# Patient Record
Sex: MALE | Race: WHITE | NOT HISPANIC OR LATINO | Employment: STUDENT | URBAN - METROPOLITAN AREA
[De-identification: names, ages, dates, MRNs, and addresses within clinical notes are randomized per-mention and may not be internally consistent; named-entity substitution may affect disease eponyms.]

---
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
## 2020-01-07 ENCOUNTER — OFFICE VISIT (OUTPATIENT)
Dept: FAMILY MEDICINE | Facility: CLINIC | Age: 20
End: 2020-01-07
Payer: COMMERCIAL

## 2020-01-07 VITALS
OXYGEN SATURATION: 97 % | BODY MASS INDEX: 24.48 KG/M2 | HEIGHT: 64 IN | RESPIRATION RATE: 18 BRPM | TEMPERATURE: 98.1 F | DIASTOLIC BLOOD PRESSURE: 80 MMHG | WEIGHT: 143.4 LBS | HEART RATE: 93 BPM | SYSTOLIC BLOOD PRESSURE: 133 MMHG

## 2020-01-07 DIAGNOSIS — Z01.818 PREOP GENERAL PHYSICAL EXAM: Primary | ICD-10-CM

## 2020-01-07 LAB — HGB BLD-MCNC: 17.2 G/DL (ref 13.3–17.7)

## 2020-01-07 PROCEDURE — 99214 OFFICE O/P EST MOD 30 MIN: CPT | Performed by: PHYSICIAN ASSISTANT

## 2020-01-07 PROCEDURE — 85018 HEMOGLOBIN: CPT | Performed by: PHYSICIAN ASSISTANT

## 2020-01-07 PROCEDURE — 36415 COLL VENOUS BLD VENIPUNCTURE: CPT | Performed by: PHYSICIAN ASSISTANT

## 2020-01-07 ASSESSMENT — MIFFLIN-ST. JEOR: SCORE: 1576.46

## 2020-01-07 NOTE — LETTER
My Asthma Action Plan    Name: Estuardo Trevino   YOB: 2000  Date: 1/7/2020   My doctor: Ananth Millard PA-C   My clinic: [facility]        My Rescue Medicine:   Albuterol inhaler (Proair/Ventolin/Proventil HFA)  2-4 puffs EVERY 4 HOURS as needed. Use a spacer if recommended by your provider.   My Asthma Severity:   Intermittent / Exercise Induced  Know your asthma triggers: upper respiratory infections, dust mites, pollens and animal dander             GREEN ZONE   Good Control    I feel good    No cough or wheeze    Can work, sleep and play without asthma symptoms       Take your asthma control medicine every day.     1. If exercise triggers your asthma, take your rescue medication    15 minutes before exercise or sports, and    During exercise if you have asthma symptoms  2. Spacer to use with inhaler: If you have a spacer, make sure to use it with your inhaler             YELLOW ZONE Getting Worse  I have ANY of these:    I do not feel good    Cough or wheeze    Chest feels tight    Wake up at night   1. Keep taking your Green Zone medications  2. Start taking your rescue medicine:    every 20 minutes for up to 1 hour. Then every 4 hours for 24-48 hours.  3. If you stay in the Yellow Zone for more than 12-24 hours, contact your doctor.  4. If you do not return to the Green Zone in 12-24 hours or you get worse, start taking your oral steroid medicine if prescribed by your provider.           RED ZONE Medical Alert - Get Help  I have ANY of these:    I feel awful    Medicine is not helping    Breathing getting harder    Trouble walking or talking    Nose opens wide to breathe       1. Take your rescue medicine NOW  2. If your provider has prescribed an oral steroid medicine, start taking it NOW  3. Call your doctor NOW  4. If you are still in the Red Zone after 20 minutes and you have not reached your doctor:    Take your rescue medicine again and    Call 911 or go to the  emergency room right away    See your regular doctor within 2 weeks of an Emergency Room or Urgent Care visit for follow-up treatment.          Annual Reminders:  Meet with Asthma Educator,  Flu Shot in the Fall, consider Pneumonia Vaccination for patients with asthma (aged 19 and older).    Pharmacy:    [facility] [place], [place] - [address] [id number] - [place], [place] - [address] AT [id number] - [place], [place] - [address] AT [facility] [address]    Electronically signed by Ananth Millard PA-C   Date: 01/07/20                    Asthma Triggers  How To Control Things That Make Your Asthma Worse    Triggers are things that make your asthma worse.  Look at the list below to help you find your triggers and   what you can do about them. You can help prevent asthma flare-ups by staying away from your triggers.      Trigger                                                          What you can do   Cigarette Smoke  Tobacco smoke can make asthma worse. Do not allow smoking in your home, car or around you.  Be sure no one smokes at a child s day care or school.  If you smoke, ask your health care provider for ways to help you quit.  Ask family members to quit too.  Ask your health care provider for a referral to Quit Plan to help you quit smoking, or call 2-842-485-PLAN.     Colds, Flu, Bronchitis  These are common triggers of asthma. Wash your hands often.  Don t touch your eyes, nose or mouth.  Get a flu shot every year.     Dust Mites  These are tiny bugs that live in cloth or carpet. They are too small to see. Wash sheets and blankets in hot water every week.   Encase pillows and mattress in dust mite proof covers.  Avoid having carpet if you can. If you have carpet, vacuum weekly.   Use a dust mask and HEPA vacuum.   Pollen and Outdoor Mold  Some people are allergic to trees, grass, or weed pollen,  or molds. Try to keep your windows closed.  Limit time out doors when pollen count is high.   Ask you health care provider about taking medicine during allergy season.     Animal Dander  Some people are allergic to skin flakes, urine or saliva from pets with fur or feathers. Keep pets with fur or feathers out of your home.    If you can t keep the pet outdoors, then keep the pet out of your bedroom.  Keep the bedroom door closed.  Keep pets off cloth furniture and away from stuffed toys.     Mice, Rats, and Cockroaches  Some people are allergic to the waste from these pests.   Cover food and garbage.  Clean up spills and food crumbs.  Store grease in the refrigerator.   Keep food out of the bedroom.   Indoor Mold  This can be a trigger if your home has high moisture. Fix leaking faucets, pipes, or other sources of water.   Clean moldy surfaces.  Dehumidify basement if it is damp and smelly.   Smoke, Strong Odors, and Sprays  These can reduce air quality. Stay away from strong odors and sprays, such as perfume, powder, hair spray, paints, smoke incense, paint, cleaning products, candles and new carpet.   Exercise or Sports  Some people with asthma have this trigger. Be active!  Ask your doctor about taking medicine before sports or exercise to prevent symptoms.    Warm up for 5-10 minutes before and after sports or exercise.     Other Triggers of Asthma  Cold air:  Cover your nose and mouth with a scarf.  Sometimes laughing or crying can be a trigger.  Some medicines and food can trigger asthma.

## 2020-01-07 NOTE — PROGRESS NOTES
[facility]  [address] [facility] [id number] [phone]  Dept: [phone]    PRE-OP EVALUATION:  Today's date: 2020    Estuardo Trevino (: 2000) presents for pre-operative evaluation assessment as requested by Dr. Ramirez.  He requires evaluation and anesthesia risk assessment prior to undergoing surgery/procedure for treatment of right knee pain .    Fax number for surgical facility:   Primary Physician: Bonny Aranda  Type of Anesthesia Anticipated: General    Patient has a Health Care Directive or Living Will:  NO    Preop Questions 2020   Who is doing your surgery? Dr. Ramirez   What are you having done? Scar tissue removal and bone adjustment   Date of Surgery/Procedure: 2020   Facility or Hospital where procedure/surgery will be performed: [facility] in [place]   1.  Do you have a history of Heart attack, stroke, stent, coronary bypass surgery, or other heart surgery? No   2.  Do you ever have any pain or discomfort in your chest? No   3.  Do you have a history of  Heart Failure? No   4.   Are you troubled by shortness of breath when:  walking on a level surface, or up a slight hill, or at night? No   5.  Do you currently have a cold, bronchitis or other respiratory infection? No   6.  Do you have a cough, shortness of breath, or wheezing? No   7.  Do you sometimes get pains in the calves of your legs when you walk? No   8. Do you or anyone in your family have previous history of blood clots? No   9.  Do you or does anyone in your family have a serious bleeding problem such as prolonged bleeding following surgeries or cuts? No   10. Have you ever had problems with anemia or been told to take iron pills? No   11. Have you had any abnormal blood loss such as black, tarry or bloody stools? No   12. Have you ever had a blood transfusion? No   13. Have you or any of your relatives ever had problems with anesthesia? No   14. Do you have sleep  apnea, excessive snoring or daytime drowsiness? No   15. Do you have any prosthetic heart valves? No   16. Do you have prosthetic joints? No         HPI:     HPI related to upcoming procedure:  History of ANTERIOR CRUCIATE LIGAMENT reconstruction 2018 with post operative development of Arthrofibrosis.     See problem list for active medical problems.  Problems all longstanding and stable, except as noted/documented.  See ROS for pertinent symptoms related to these conditions.      MEDICAL HISTORY:     Patient Active Problem List    Diagnosis Date Noted     Mild intermittent asthma without complication 11/23/2018     Priority: Medium     Right knee pain, unspecified chronicity 11/23/2018     Priority: Medium     Seasonal allergies 09/13/2013     Priority: Medium     Epistaxis 10/21/2011     Priority: Medium      Past Medical History:   Diagnosis Date     Asthma      Past Surgical History:   Procedure Laterality Date     ARTHROSCOPIC RECONSTRUCTION ANTERIOR CRUCIATE LIGAMENT BONE TENDON BONE AUTOGRAFT Right 12/21/2018    Procedure: Examination Under Anesthesia Right Knee, Right Anterior Cruciate Ligament Reconstruction, Bone Tendon Bone Autograft;  Surgeon: Ady Espinoza MD;  Location: [facility] OR     EXAM UNDER ANESTHESIA, MANIPULATE JOINT (LOCATION) Right 3/22/2019    Procedure: Examination Under Anesthesia Right Knee, Right Knee Manipulation Under Anesthesia;  Surgeon: Ady Espinoza MD;  Location: [facility] OR     LYSIS OF ADHESIONS KNEE Right 3/22/2019    Procedure: Right Knee Arthroscopic Lysis of Adhesions;  Surgeon: Ady Espinoza MD;  Location: [facility] OR     wisdom teeth       Current Outpatient Medications   Medication Sig Dispense Refill     acetaminophen (TYLENOL) 325 MG tablet Take 2 tablets (650 mg) by mouth every 4 hours 80 tablet 0     albuterol (PROAIR HFA/PROVENTIL HFA/VENTOLIN HFA) 108 (90 Base) MCG/ACT inhaler INHALE 2 PUFFS INTO THE LUNGS EVERY 4 HOURS AS NEEDED FOR  SHORTNESS OF BREATH OR DIFFICULT BREATHING 8.5 g 3     albuterol (PROAIR HFA/PROVENTIL HFA/VENTOLIN HFA) 108 (90 Base) MCG/ACT inhaler Inhale 2 puffs into the lungs every 4 hours as needed for shortness of breath / dyspnea 1 Inhaler 0     budesonide (PULMICORT FLEXHALER) 180 MCG/ACT inhaler Inhale 1 puff into the lungs 2 times daily 1 Inhaler 0     diclofenac (VOLTAREN) 75 MG EC tablet Take 1 tablet (75 mg) by mouth 2 times daily 60 tablet 0     methylPREDNISolone (MEDROL DOSEPAK) 4 MG tablet therapy pack Take 2 tablets (8 mg) by mouth See Admin Instructions follow package directions 21 tablet 0     PULMICORT FLEXHALER 180 MCG/ACT inhaler INHALE 1 PUFF INTO THE LUNGS TWICE DAILY 3 Inhaler 1     ondansetron (ZOFRAN-ODT) 4 MG ODT tab Take 1-2 tablets (4-8 mg) by mouth every 8 hours as needed for nausea (Patient not taking: Reported on 4/15/2019) 4 tablet 0     oxyCODONE (ROXICODONE) 5 MG tablet Take 1-2 tablets (5-10 mg) by mouth every 4 hours as needed for moderate to severe pain (Patient not taking: Reported on 4/15/2019) 26 tablet 0     senna-docusate (SENOKOT-S/PERICOLACE) 8.6-50 MG tablet Take 1-2 tablets by mouth 2 times daily (Patient not taking: Reported on 4/15/2019) 16 tablet 0     tiZANidine (ZANAFLEX) 4 MG tablet Take 1-2 tablets (4-8 mg) by mouth every 8 hours as needed for muscle spasms 60 tablet 0     OTC products: None, except as noted above    Allergies   Allergen Reactions     Nkda [No Known Drug Allergies]       Latex Allergy: NO    Social History     Tobacco Use     Smoking status: Never Smoker     Smokeless tobacco: Never Used   Substance Use Topics     Alcohol use: No     History   Drug Use No       REVIEW OF SYSTEMS:   CONSTITUTIONAL: NEGATIVE for fever, chills, change in weight  INTEGUMENTARY/SKIN: NEGATIVE for worrisome rashes, moles or lesions  EYES: NEGATIVE for vision changes or irritation  ENT/MOUTH: NEGATIVE for ear, mouth and throat problems  RESP: NEGATIVE for significant cough or  "SOB  CV: NEGATIVE for chest pain, palpitations or peripheral edema  GI: NEGATIVE for nausea, abdominal pain, heartburn, or change in bowel habits  : NEGATIVE for frequency, dysuria, or hematuria  MUSCULOSKELETAL: NEGATIVE for significant arthralgias or myalgia  NEURO: NEGATIVE for weakness, dizziness or paresthesias  ENDOCRINE: NEGATIVE for temperature intolerance, skin/hair changes  HEME: NEGATIVE for bleeding problems  PSYCHIATRIC: NEGATIVE for changes in mood or affect    EXAM:   /80   Pulse 93   Temp 98.1  F (36.7  C) (Oral)   Resp 18   Ht 1.626 m (5' 4\")   Wt 65 kg (143 lb 6.4 oz)   SpO2 97%   BMI 24.61 kg/m      GENERAL APPEARANCE: healthy, alert and no distress     EYES: EOMI,  PERRL     HENT: ear canals and TM's normal and nose and mouth without ulcers or lesions     NECK: no adenopathy, no asymmetry, masses, or scars and thyroid normal to palpation     RESP: lungs clear to auscultation - no rales, rhonchi or wheezes     CV: regular rates and rhythm, normal S1 S2, no S3 or S4 and no murmur, click or rub     ABDOMEN:  soft, nontender, no HSM or masses and bowel sounds normal     MS: extremities normal- no gross deformities noted, no evidence of inflammation in joints, FROM in all extremities.     SKIN: no suspicious lesions or rashes     NEURO: Normal strength and tone, sensory exam grossly normal, mentation intact and speech normal     PSYCH: mentation appears normal. and affect normal/bright     LYMPHATICS: No cervical adenopathy    DIAGNOSTICS:     Labs Drawn and in Process:     Results for orders placed or performed in visit on 01/07/20   Hemoglobin     Status: None   Result Value Ref Range    Hemoglobin 17.2 13.3 - 17.7 g/dL        Recent Labs   Lab Test 11/23/18  1046 10/23/17  1359   HGB 16.6 17.6*   PLT  --  269        IMPRESSION:   Reason for surgery/procedure: right knee pain    The proposed surgical procedure is considered LOW risk.    REVISED CARDIAC RISK INDEX  The patient has the " following serious cardiovascular risks for perioperative complications such as (MI, PE, VFib and 3  AV Block):  No serious cardiac risks  INTERPRETATION: 0 risks: Class I (very low risk - 0.4% complication rate)    The patient has the following additional risks for perioperative complications:  No identified additional risks      ICD-10-CM    1. Preop general physical exam Z01.818 Hemoglobin       RECOMMENDATIONS:         --Patient is on no chronic medications    APPROVAL GIVEN to proceed with proposed procedure, without further diagnostic evaluation       Signed Electronically by: Ananth Millard PA-C  Discussed this patient with Ananth Millard and agree with above assessment and plan. The patient is an acceptable candidate for the proposed anasthesia.  Jim Escalante MD      Copy of this evaluation report is provided to requesting physician.    [facility] Preop Guidelines    Revised Cardiac Risk Index

## 2020-01-08 ASSESSMENT — ASTHMA QUESTIONNAIRES: ACT_TOTALSCORE: 25

## 2020-01-08 NOTE — PROGRESS NOTES
No fax number given on pre-op. Called [facility] in [place] at # [phone] and spoke to a representative. She gave me the fax number of # [phone] for Dr. Ramirez. Pre-op and lab result have been faxed to  # [phone]. DOS is 02/04/2020.  Danette Frausto TC

## 2020-01-09 ENCOUNTER — TELEPHONE (OUTPATIENT)
Dept: FAMILY MEDICINE | Facility: CLINIC | Age: 20
End: 2020-01-09

## 2020-01-09 NOTE — TELEPHONE ENCOUNTER
Reason for Call:  Form, our goal is to have forms completed with 72 hours, however, some forms may require a visit or additional information.    Type of letter, form or note:  medical    Who is the form from?: Patient    Where did the form come from: Patient or family brought in       What clinic location was the form placed at?: [place]    Where the form was placed: oppel's t[facility] Box/Folder    What number is listed as a contact on the form?: [phone]       Additional comments: pt forgot to bring in forms at his appointment    Call taken on 1/9/2020 at 11:28 AM by Keiry Hopkins

## 2020-01-31 ENCOUNTER — TELEPHONE (OUTPATIENT)
Dept: FAMILY MEDICINE | Facility: CLINIC | Age: 20
End: 2020-01-31

## 2020-01-31 NOTE — TELEPHONE ENCOUNTER
Pre-op was initially faxed to [facility] [place] Att.Dr. Hernandez @ [phone] on 01/08/2020, I have re-faxed it to the same number as well as the other generic [facility] fax # [phone] as patient didn't leave any fax info in message. DOS 02/04/2020.   I called the patient @ [phone]. No answer. I left a detailed voicemail that I have refaxed the pre-op and if he has any other questions or concerns to call my direct line # [phone].  Danette Frausto TC

## 2020-01-31 NOTE — TELEPHONE ENCOUNTER
Patient calling had a pre op on 1/7 and is scheduled for surgery on 2/4. Surgery center called him and they have not received the paper work. States brought in packet with name of surgery center and fax number. Does not have info at this time.

## 2020-02-04 ENCOUNTER — TRANSFERRED RECORDS (OUTPATIENT)
Dept: HEALTH INFORMATION MANAGEMENT | Facility: CLINIC | Age: 20
End: 2020-02-04

## 2020-02-07 ENCOUNTER — TRANSFERRED RECORDS (OUTPATIENT)
Dept: HEALTH INFORMATION MANAGEMENT | Facility: CLINIC | Age: 20
End: 2020-02-07

## 2020-02-17 ENCOUNTER — TRANSFERRED RECORDS (OUTPATIENT)
Dept: HEALTH INFORMATION MANAGEMENT | Facility: CLINIC | Age: 20
End: 2020-02-17

## 2020-02-24 ENCOUNTER — HEALTH MAINTENANCE LETTER (OUTPATIENT)
Age: 20
End: 2020-02-24

## 2020-03-04 ENCOUNTER — MYC MEDICAL ADVICE (OUTPATIENT)
Dept: FAMILY MEDICINE | Facility: CLINIC | Age: 20
End: 2020-03-04

## 2020-03-04 ENCOUNTER — TRANSFERRED RECORDS (OUTPATIENT)
Dept: HEALTH INFORMATION MANAGEMENT | Facility: CLINIC | Age: 20
End: 2020-03-04

## 2020-03-04 ENCOUNTER — ANCILLARY PROCEDURE (OUTPATIENT)
Dept: GENERAL RADIOLOGY | Facility: CLINIC | Age: 20
End: 2020-03-04
Attending: INTERNAL MEDICINE
Payer: COMMERCIAL

## 2020-03-04 ENCOUNTER — OFFICE VISIT (OUTPATIENT)
Dept: FAMILY MEDICINE | Facility: CLINIC | Age: 20
End: 2020-03-04
Payer: COMMERCIAL

## 2020-03-04 VITALS
BODY MASS INDEX: 23.34 KG/M2 | TEMPERATURE: 98.9 F | HEART RATE: 103 BPM | RESPIRATION RATE: 16 BRPM | SYSTOLIC BLOOD PRESSURE: 148 MMHG | WEIGHT: 136 LBS | DIASTOLIC BLOOD PRESSURE: 81 MMHG | OXYGEN SATURATION: 99 %

## 2020-03-04 DIAGNOSIS — R19.7 DIARRHEA, UNSPECIFIED TYPE: ICD-10-CM

## 2020-03-04 DIAGNOSIS — K59.00 CONSTIPATION, UNSPECIFIED CONSTIPATION TYPE: Primary | ICD-10-CM

## 2020-03-04 PROCEDURE — 74019 RADEX ABDOMEN 2 VIEWS: CPT

## 2020-03-04 PROCEDURE — 99214 OFFICE O/P EST MOD 30 MIN: CPT | Performed by: INTERNAL MEDICINE

## 2020-03-04 RX ORDER — POLYETHYLENE GLYCOL 3350 17 G/17G
1 POWDER, FOR SOLUTION ORAL DAILY
Qty: 850 G | Refills: 0 | Status: SHIPPED | OUTPATIENT
Start: 2020-03-04 | End: 2023-03-11

## 2020-03-04 NOTE — PROGRESS NOTES
SUBJECTIVE:  Estuardo Trevino is an 19 year old male who presents for diarrhea.  started about 9 days ago and lasted 2-3 days; then had constipation a few days, then diarrhea again over past couple days.   Diarrhea is liquid but not water.   No abdominal pain or cramping. Has been gassy, glen after eating.  Has had issues over past few months.  A little blood on toilet paper a couple times after having a BM.  No blood mixed into stool and no black stool.  No fevers, chills, sweats currently.  No recent travel.  No n/v.  Eating normally but sometimes feels like it goes right through him.  No known exposures.  Took an antidiarrhea med yesterday and today which helped a little yesterday.  No FH of bowel issues or problems. Is on methylprednisolone for scar reduction after knee surgery on 2/4/2020.  Also was on pain medication after surgery also.      PMH:   has a past medical history of Asthma.  Patient Active Problem List   Diagnosis     Epistaxis     Seasonal allergies     Mild intermittent asthma without complication     Right knee pain, unspecified chronicity     Social History     Socioeconomic History     Marital status: Single     Spouse name: Not on file     Number of children: Not on file     Years of education: Not on file     Highest education level: Not on file   Occupational History     Not on file   Social Needs     Financial resource strain: Not on file     Food insecurity:     Worry: Not on file     Inability: Not on file     Transportation needs:     Medical: Not on file     Non-medical: Not on file   Tobacco Use     Smoking status: Never Smoker     Smokeless tobacco: Never Used   Substance and Sexual Activity     Alcohol use: No     Drug use: No     Sexual activity: Never   Lifestyle     Physical activity:     Days per week: Not on file     Minutes per session: Not on file     Stress: Not on file   Relationships     Social connections:     Talks on phone: Not on file     Gets together: Not on file      Attends Sabianism service: Not on file     Active member of club or organization: Not on file     Attends meetings of clubs or organizations: Not on file     Relationship status: Not on file     Intimate partner violence:     Fear of current or ex partner: Not on file     Emotionally abused: Not on file     Physically abused: Not on file     Forced sexual activity: Not on file   Other Topics Concern     Not on file   Social History Narrative     Not on file     Family History   Problem Relation Age of Onset     Eye Disorder Mother      Allergies Father      Diabetes Maternal Grandmother         type 2     Depression Maternal Grandmother      Hypertension Maternal Grandfather      Lipids Maternal Grandfather      C.A.D. Maternal Grandfather      Asthma Brother        ALLERGIES:  Nkda [no known drug allergies]    Current Outpatient Medications   Medication     methylPREDNISolone (MEDROL DOSEPAK) 4 MG tablet therapy pack     polyethylene glycol (MIRALAX) powder     acetaminophen (TYLENOL) 325 MG tablet     albuterol (PROAIR HFA/PROVENTIL HFA/VENTOLIN HFA) 108 (90 Base) MCG/ACT inhaler     albuterol (PROAIR HFA/PROVENTIL HFA/VENTOLIN HFA) 108 (90 Base) MCG/ACT inhaler     budesonide (PULMICORT FLEXHALER) 180 MCG/ACT inhaler     diclofenac (VOLTAREN) 75 MG EC tablet     ondansetron (ZOFRAN-ODT) 4 MG ODT tab     oxyCODONE (ROXICODONE) 5 MG tablet     PULMICORT FLEXHALER 180 MCG/ACT inhaler     senna-docusate (SENOKOT-S/PERICOLACE) 8.6-50 MG tablet     tiZANidine (ZANAFLEX) 4 MG tablet     Current Facility-Administered Medications   Medication     lidocaine (PF) 0.5 % injection SOLN 8 mL     triamcinolone (KENALOG-40) injection 40 mg         ROS:  ROS is done and is negative for general/constitutional, eye, ENT, Respiratory, cardiovascular, GI, , Skin, musculoskeletal except as noted elsewhere.  All other review of systems negative except as noted elsewhere.      OBJECTIVE:  BP (!) 148/81   Pulse 103   Temp 98.9  F  (37.2  C) (Oral)   Resp 16   Wt 61.7 kg (136 lb)   SpO2 99%   BMI 23.34 kg/m    GENERAL APPEARANCE: Alert, in no acute distress  EYES: normal  NOSE:normal  OROPHARYNX:normal  NECK:No adenopathy,masses or thyromegaly  RESP: normal and clear to auscultation  CV:regular rate and rhythm and no murmurs, clicks, or gallops  ABDOMEN: Abdomen soft, non-tender. BS normal. No masses, organomegaly  SKIN: no ulcers, lesions or rash  MUSCULOSKELETAL:Musculoskeletal normal      RESULTS  Xrays of abdomen-  Moderate stool present, no free air, on my reading.      .  Recent Results (from the past 48 hour(s))   XR Abdomen 2 Views    Narrative    ABDOMEN TWO-THREE VIEW  3/4/2020 4:18 PM     HISTORY: Diarrhea, unspecified type.    COMPARISON: None.    FINDINGS: Small to moderate amount of stool. No free air. There are no  air filled distended loops of small bowel. The colon is not distended.  The lung bases are unremarkable.      Impression    IMPRESSION: Nonobstructed bowel gas pattern.       ASSESSMENT/PLAN:    ASSESSMENT / PLAN:  (K59.00) Constipation, unspecified constipation type  (primary encounter diagnosis)  Comment: pt's sxs are c/w constipation and moderate stool seen on xray.  His recent surgery and post-op pain meds likely contributed to onset.  He has had a little blood on toilet paper a couple times which is likely related to passing of stool irritating rectum, but if not resolve, he is to see GI.  Plan: polyethylene glycol (MIRALAX) powder        Reviewed medication instructions and side effects. Follow up if experiences side effects..  See instructions for tapering med as needed.  I reviewed supportive care, otc meds to use if needed, expected course, and signs of concern.  Follow up as needed if he does not improve within 2 weeks.  If he does not improve or if his sxs recur or he has ongoing blood from rectum, he is to see GI, so referral placed in case is needed.  Reviewed red flag symptoms and is to go to the ER  if experiences any of these.    (R19.7) Diarrhea, unspecified type  Comment: currently c/w underlying issue of constipation.   Plan: XR Abdomen 2 Views, polyethylene glycol         (MIRALAX) powder, GASTROENTEROLOGY ADULT REF         CONSULT ONLY        As above.  If sxs persist he is to see GI so referral placed in case needed.      See [facility] for orders, medications, letters, patient instructions    Nydia Taylor M.D.

## 2020-03-05 ENCOUNTER — MYC MEDICAL ADVICE (OUTPATIENT)
Dept: FAMILY MEDICINE | Facility: CLINIC | Age: 20
End: 2020-03-05

## 2020-03-09 ENCOUNTER — MYC MEDICAL ADVICE (OUTPATIENT)
Dept: FAMILY MEDICINE | Facility: CLINIC | Age: 20
End: 2020-03-09

## 2020-03-12 ENCOUNTER — E-VISIT (OUTPATIENT)
Dept: FAMILY MEDICINE | Facility: CLINIC | Age: 20
End: 2020-03-12
Payer: COMMERCIAL

## 2020-03-12 DIAGNOSIS — R19.7 BLOODY DIARRHEA: Primary | ICD-10-CM

## 2020-03-12 PROCEDURE — 99421 OL DIG E/M SVC 5-10 MIN: CPT | Performed by: FAMILY MEDICINE

## 2020-03-13 DIAGNOSIS — R19.7 BLOODY DIARRHEA: ICD-10-CM

## 2020-03-13 LAB
ALBUMIN SERPL-MCNC: 3.9 G/DL (ref 3.4–5)
ALP SERPL-CCNC: 77 U/L (ref 65–260)
ALT SERPL W P-5'-P-CCNC: 28 U/L (ref 0–50)
ANION GAP SERPL CALCULATED.3IONS-SCNC: 5 MMOL/L (ref 3–14)
AST SERPL W P-5'-P-CCNC: 14 U/L (ref 0–35)
BASOPHILS # BLD AUTO: 0 10E9/L (ref 0–0.2)
BASOPHILS NFR BLD AUTO: 0.3 %
BILIRUB SERPL-MCNC: 0.4 MG/DL (ref 0.2–1.3)
BUN SERPL-MCNC: 14 MG/DL (ref 7–30)
CALCIUM SERPL-MCNC: 9.5 MG/DL (ref 8.5–10.1)
CHLORIDE SERPL-SCNC: 107 MMOL/L (ref 98–110)
CO2 SERPL-SCNC: 28 MMOL/L (ref 20–32)
CREAT SERPL-MCNC: 0.84 MG/DL (ref 0.5–1)
CRP SERPL-MCNC: <2.9 MG/L (ref 0–8)
DIFFERENTIAL METHOD BLD: NORMAL
EOSINOPHIL # BLD AUTO: 0.1 10E9/L (ref 0–0.7)
EOSINOPHIL NFR BLD AUTO: 2.1 %
ERYTHROCYTE [DISTWIDTH] IN BLOOD BY AUTOMATED COUNT: 12.8 % (ref 10–15)
ERYTHROCYTE [SEDIMENTATION RATE] IN BLOOD BY WESTERGREN METHOD: 4 MM/H (ref 0–15)
GFR SERPL CREATININE-BSD FRML MDRD: >90 ML/MIN/{1.73_M2}
GLUCOSE SERPL-MCNC: 94 MG/DL (ref 70–99)
HCT VFR BLD AUTO: 50.6 % (ref 40–53)
HGB BLD-MCNC: 17.7 G/DL (ref 13.3–17.7)
LYMPHOCYTES # BLD AUTO: 2.4 10E9/L (ref 0.8–5.3)
LYMPHOCYTES NFR BLD AUTO: 39 %
MCH RBC QN AUTO: 30.8 PG (ref 26.5–33)
MCHC RBC AUTO-ENTMCNC: 35 G/DL (ref 31.5–36.5)
MCV RBC AUTO: 88 FL (ref 78–100)
MONOCYTES # BLD AUTO: 0.5 10E9/L (ref 0–1.3)
MONOCYTES NFR BLD AUTO: 7.5 %
NEUTROPHILS # BLD AUTO: 3.2 10E9/L (ref 1.6–8.3)
NEUTROPHILS NFR BLD AUTO: 51.1 %
PLATELET # BLD AUTO: 302 10E9/L (ref 150–450)
POTASSIUM SERPL-SCNC: 4.1 MMOL/L (ref 3.4–5.3)
PROT SERPL-MCNC: 7.5 G/DL (ref 6.8–8.8)
RBC # BLD AUTO: 5.75 10E12/L (ref 4.4–5.9)
SODIUM SERPL-SCNC: 140 MMOL/L (ref 133–144)
WBC # BLD AUTO: 6.3 10E9/L (ref 4–11)

## 2020-03-13 PROCEDURE — 36415 COLL VENOUS BLD VENIPUNCTURE: CPT | Performed by: FAMILY MEDICINE

## 2020-03-13 PROCEDURE — 80053 COMPREHEN METABOLIC PANEL: CPT | Performed by: FAMILY MEDICINE

## 2020-03-13 PROCEDURE — 85025 COMPLETE CBC W/AUTO DIFF WBC: CPT | Performed by: FAMILY MEDICINE

## 2020-03-13 PROCEDURE — 85652 RBC SED RATE AUTOMATED: CPT | Performed by: FAMILY MEDICINE

## 2020-03-13 PROCEDURE — 86140 C-REACTIVE PROTEIN: CPT | Performed by: FAMILY MEDICINE

## 2020-03-14 DIAGNOSIS — R19.7 BLOODY DIARRHEA: ICD-10-CM

## 2020-03-14 PROCEDURE — 87328 CRYPTOSPORIDIUM AG IA: CPT | Performed by: FAMILY MEDICINE

## 2020-03-14 PROCEDURE — 87506 IADNA-DNA/RNA PROBE TQ 6-11: CPT | Performed by: FAMILY MEDICINE

## 2020-03-14 PROCEDURE — 36415 COLL VENOUS BLD VENIPUNCTURE: CPT | Performed by: FAMILY MEDICINE

## 2020-03-14 PROCEDURE — 87329 GIARDIA AG IA: CPT | Mod: XU | Performed by: FAMILY MEDICINE

## 2020-03-16 ENCOUNTER — TRANSFERRED RECORDS (OUTPATIENT)
Dept: HEALTH INFORMATION MANAGEMENT | Facility: CLINIC | Age: 20
End: 2020-03-16

## 2020-03-16 LAB
C PARVUM AG STL QL IA: NEGATIVE
G LAMBLIA AG STL QL IA: NEGATIVE
SPECIMEN SOURCE: NORMAL

## 2020-03-23 ENCOUNTER — DOCUMENTATION ONLY (OUTPATIENT)
Dept: LAB | Facility: CLINIC | Age: 20
End: 2020-03-23

## 2020-03-24 ENCOUNTER — TELEPHONE (OUTPATIENT)
Dept: LAB | Facility: CLINIC | Age: 20
End: 2020-03-24

## 2020-03-24 NOTE — TELEPHONE ENCOUNTER
3.24.2020 Called patient and spoke to his mother, need to know if he dropped off 2 specimens or 3 on the 14th. She will have him call lab back.     Thank you,   Sulema Lezama MLT ([facility])

## 2020-03-24 NOTE — PROGRESS NOTES
Called patient to clarify if he dropped of 2 samples or 3 on the 14th. Mom is having him call the lab back. Tech working on the 14th only recalls 2 specimens being dropped off.    Thank you,   Sulema Lezama MLT ([facility])

## 2020-03-24 NOTE — PROGRESS NOTES
The patient returned my call and he said that he did drop off a stool sample at the [facility] lab on 3/14/20.  He didn't state whether it was a formed or a liquid stool sample.  Cat Caldwell,

## 2020-03-24 NOTE — PROGRESS NOTES
I called the patient @[phone] and LM asking him to call me back.  My direct line given [phone].  Cat Caldwell,

## 2020-04-13 ENCOUNTER — TRANSFERRED RECORDS (OUTPATIENT)
Dept: HEALTH INFORMATION MANAGEMENT | Facility: CLINIC | Age: 20
End: 2020-04-13

## 2020-04-16 ENCOUNTER — MYC MEDICAL ADVICE (OUTPATIENT)
Dept: FAMILY MEDICINE | Facility: CLINIC | Age: 20
End: 2020-04-16

## 2020-08-17 ENCOUNTER — TRANSFERRED RECORDS (OUTPATIENT)
Dept: HEALTH INFORMATION MANAGEMENT | Facility: CLINIC | Age: 20
End: 2020-08-17

## 2020-12-13 ENCOUNTER — HEALTH MAINTENANCE LETTER (OUTPATIENT)
Age: 20
End: 2020-12-13

## 2021-01-18 ENCOUNTER — TRANSFERRED RECORDS (OUTPATIENT)
Dept: HEALTH INFORMATION MANAGEMENT | Facility: CLINIC | Age: 21
End: 2021-01-18

## 2021-03-11 ENCOUNTER — MYC MEDICAL ADVICE (OUTPATIENT)
Dept: FAMILY MEDICINE | Facility: CLINIC | Age: 21
End: 2021-03-11

## 2021-03-11 NOTE — LETTER
[facility]  [address] [facility] [id number] [phone]        March 12, 2021      [facility]  [address] [facility] [place] [address]      Dear Estuardo,    As your health care provider, I am concerned that your age and/or underlying medical condition puts you at particularly high risk for serious complications should you contract a COVID-19 infection.     Specifically, you have the following conditions/diagnoses, included as high risk conditions per the Centers for Disease Control and Prevention at CDC.gov.     Moderate to severe asthma  Immunocompromised status    Based on these diagnosis, you are recommended to receive a covid-19 shot.          Bonny Martin MD    [facility]

## 2021-03-16 ENCOUNTER — IMMUNIZATION (OUTPATIENT)
Dept: NURSING | Facility: CLINIC | Age: 21
End: 2021-03-16
Payer: COMMERCIAL

## 2021-03-16 PROCEDURE — 0001A PR COVID VAC PFIZER DIL RECON 30 MCG/0.3 ML IM: CPT

## 2021-03-16 PROCEDURE — 91300 PR COVID VAC PFIZER DIL RECON 30 MCG/0.3 ML IM: CPT

## 2021-04-06 ENCOUNTER — IMMUNIZATION (OUTPATIENT)
Dept: NURSING | Facility: CLINIC | Age: 21
End: 2021-04-06
Payer: COMMERCIAL

## 2021-04-06 PROCEDURE — 0002A PR COVID VAC PFIZER DIL RECON 30 MCG/0.3 ML IM: CPT

## 2021-04-06 PROCEDURE — 91300 PR COVID VAC PFIZER DIL RECON 30 MCG/0.3 ML IM: CPT

## 2021-04-17 ENCOUNTER — HEALTH MAINTENANCE LETTER (OUTPATIENT)
Age: 21
End: 2021-04-17

## 2021-04-22 ENCOUNTER — MYC MEDICAL ADVICE (OUTPATIENT)
Dept: FAMILY MEDICINE | Facility: CLINIC | Age: 21
End: 2021-04-22

## 2021-04-22 DIAGNOSIS — J45.21 INTERMITTENT ASTHMA, WITH ACUTE EXACERBATION: ICD-10-CM

## 2021-04-22 RX ORDER — BUDESONIDE 180 UG/1
1 AEROSOL, POWDER RESPIRATORY (INHALATION) 2 TIMES DAILY
Qty: 1 EACH | Refills: 1 | Status: SHIPPED | OUTPATIENT
Start: 2021-04-22 | End: 2022-02-26

## 2021-04-22 RX ORDER — ALBUTEROL SULFATE 90 UG/1
AEROSOL, METERED RESPIRATORY (INHALATION)
Qty: 8.5 G | Refills: 3 | Status: SHIPPED | OUTPATIENT
Start: 2021-04-22 | End: 2022-02-26

## 2021-04-22 NOTE — LETTER
April 22, 2021      Estuardo Trevino  [address] [facility] [address]      Dear Estuardo,     Your clinic record indicates that you are due for an asthma update. We have a survey tool called an ACT (or Asthma Control Test) we use to measure the level of control of your asthma. Please complete the enclosed questionnaire and mail it back to us in the self-addressed stamped envelope.     If you have questions about this letter please contact your provider.     Sincerely,       Your [facility]

## 2021-04-22 NOTE — TELEPHONE ENCOUNTER
PCP has not seen patient since 2017.    Routing to provider to advise.  Karlee Rosario BSN, RN

## 2021-05-10 ASSESSMENT — ASTHMA QUESTIONNAIRES
QUESTION_3 LAST FOUR WEEKS HOW OFTEN DID YOUR ASTHMA SYMPTOMS (WHEEZING, COUGHING, SHORTNESS OF BREATH, CHEST TIGHTNESS OR PAIN) WAKE YOU UP AT NIGHT OR EARLIER THAN USUAL IN THE MORNING: NOT AT ALL
QUESTION_2 LAST FOUR WEEKS HOW OFTEN HAVE YOU HAD SHORTNESS OF BREATH: NOT AT ALL
QUESTION_4 LAST FOUR WEEKS HOW OFTEN HAVE YOU USED YOUR RESCUE INHALER OR NEBULIZER MEDICATION (SUCH AS ALBUTEROL): NOT AT ALL
QUESTION_1 LAST FOUR WEEKS HOW MUCH OF THE TIME DID YOUR ASTHMA KEEP YOU FROM GETTING AS MUCH DONE AT WORK, SCHOOL OR AT HOME: NONE OF THE TIME
ACT_TOTALSCORE: 25
QUESTION_5 LAST FOUR WEEKS HOW WOULD YOU RATE YOUR ASTHMA CONTROL: COMPLETELY CONTROLLED

## 2021-09-26 ENCOUNTER — HEALTH MAINTENANCE LETTER (OUTPATIENT)
Age: 21
End: 2021-09-26

## 2021-12-17 ENCOUNTER — MYC MEDICAL ADVICE (OUTPATIENT)
Dept: FAMILY MEDICINE | Facility: CLINIC | Age: 21
End: 2021-12-17
Payer: COMMERCIAL

## 2021-12-21 ENCOUNTER — IMMUNIZATION (OUTPATIENT)
Dept: NURSING | Facility: CLINIC | Age: 21
End: 2021-12-21
Payer: COMMERCIAL

## 2021-12-21 PROCEDURE — 91300 PR COVID VAC PFIZER DIL RECON 30 MCG/0.3 ML IM: CPT

## 2021-12-21 PROCEDURE — 0004A PR COVID VAC PFIZER DIL RECON 30 MCG/0.3 ML IM: CPT

## 2022-02-26 DIAGNOSIS — J45.21 INTERMITTENT ASTHMA, WITH ACUTE EXACERBATION: ICD-10-CM

## 2022-02-26 RX ORDER — BUDESONIDE 180 UG/1
1 AEROSOL, POWDER RESPIRATORY (INHALATION) 2 TIMES DAILY
Qty: 1 EACH | Refills: 1 | Status: SHIPPED | OUTPATIENT
Start: 2022-02-26 | End: 2023-03-11

## 2022-02-26 RX ORDER — ALBUTEROL SULFATE 90 UG/1
AEROSOL, METERED RESPIRATORY (INHALATION)
Qty: 8.5 G | Refills: 3 | Status: SHIPPED | OUTPATIENT
Start: 2022-02-26 | End: 2023-03-11

## 2022-02-26 RX ORDER — ALBUTEROL SULFATE 0.83 MG/ML
2.5 SOLUTION RESPIRATORY (INHALATION) EVERY 6 HOURS PRN
Qty: 90 ML | Refills: 1 | Status: SHIPPED | OUTPATIENT
Start: 2022-02-26 | End: 2023-03-11

## 2022-02-27 ENCOUNTER — E-VISIT (OUTPATIENT)
Dept: FAMILY MEDICINE | Facility: CLINIC | Age: 22
End: 2022-02-27
Payer: COMMERCIAL

## 2022-02-27 DIAGNOSIS — R09.81 CONGESTION OF PARANASAL SINUS: ICD-10-CM

## 2022-02-27 DIAGNOSIS — J45.21 EXACERBATION OF INTERMITTENT ASTHMA, UNSPECIFIED ASTHMA SEVERITY: Primary | ICD-10-CM

## 2022-02-27 PROCEDURE — 99421 OL DIG E/M SVC 5-10 MIN: CPT | Performed by: FAMILY MEDICINE

## 2022-02-27 RX ORDER — PREDNISONE 20 MG/1
40 TABLET ORAL DAILY
Qty: 10 TABLET | Refills: 0 | Status: SHIPPED | OUTPATIENT
Start: 2022-02-27 | End: 2023-03-11

## 2022-02-27 RX ORDER — AZITHROMYCIN 250 MG/1
TABLET, FILM COATED ORAL
Qty: 6 TABLET | Refills: 0 | Status: SHIPPED | OUTPATIENT
Start: 2022-02-27 | End: 2022-03-04

## 2022-02-28 ENCOUNTER — TELEPHONE (OUTPATIENT)
Dept: FAMILY MEDICINE | Facility: CLINIC | Age: 22
End: 2022-02-28
Payer: COMMERCIAL

## 2022-02-28 NOTE — TELEPHONE ENCOUNTER
Message: PULMICORT FLEXHALER not covered by pt plan. Pref alt is: Arnuity Ellipta, Asamanex, Asamanex HFA, Flovent Diskus, Flovent HFA, QVAR Redihaler.

## 2022-03-26 ENCOUNTER — MYC MEDICAL ADVICE (OUTPATIENT)
Dept: FAMILY MEDICINE | Facility: CLINIC | Age: 22
End: 2022-03-26
Payer: COMMERCIAL

## 2022-03-26 DIAGNOSIS — J01.00 ACUTE NON-RECURRENT MAXILLARY SINUSITIS: Primary | ICD-10-CM

## 2022-03-28 RX ORDER — AZITHROMYCIN 250 MG/1
TABLET, FILM COATED ORAL
Qty: 6 TABLET | Refills: 0 | Status: SHIPPED | OUTPATIENT
Start: 2022-03-28 | End: 2022-04-02

## 2022-03-28 NOTE — TELEPHONE ENCOUNTER
To provider to advise, patient did an E-visit with provider on 2/27 and treated for sinus infection.       Lisset COONEYN, RN

## 2022-05-08 ENCOUNTER — HEALTH MAINTENANCE LETTER (OUTPATIENT)
Age: 22
End: 2022-05-08

## 2022-07-11 ENCOUNTER — TELEPHONE (OUTPATIENT)
Dept: FAMILY MEDICINE | Facility: CLINIC | Age: 22
End: 2022-07-11

## 2022-07-11 NOTE — TELEPHONE ENCOUNTER
Patient Quality Outreach    Patient is due for the following:   Asthma  -  ACT needed    NEXT STEPS:   No follow up needed at this time.    Type of outreach:    Sent [employer] message.      Questions for provider review:    none     Lesly Dyson MA

## 2022-08-16 ENCOUNTER — OFFICE VISIT (OUTPATIENT)
Dept: FAMILY MEDICINE | Facility: CLINIC | Age: 22
End: 2022-08-16
Payer: COMMERCIAL

## 2022-08-16 VITALS
HEART RATE: 80 BPM | HEIGHT: 64 IN | BODY MASS INDEX: 25.78 KG/M2 | DIASTOLIC BLOOD PRESSURE: 83 MMHG | SYSTOLIC BLOOD PRESSURE: 127 MMHG | OXYGEN SATURATION: 97 % | TEMPERATURE: 98.3 F | WEIGHT: 151 LBS | RESPIRATION RATE: 18 BRPM

## 2022-08-16 DIAGNOSIS — J45.20 MILD INTERMITTENT ASTHMA WITHOUT COMPLICATION: ICD-10-CM

## 2022-08-16 DIAGNOSIS — R21 RASH: Primary | ICD-10-CM

## 2022-08-16 PROCEDURE — 99213 OFFICE O/P EST LOW 20 MIN: CPT | Performed by: STUDENT IN AN ORGANIZED HEALTH CARE EDUCATION/TRAINING PROGRAM

## 2022-08-16 RX ORDER — CETIRIZINE HYDROCHLORIDE 5 MG/1
5 TABLET ORAL DAILY
Qty: 30 TABLET | Refills: 0 | Status: SHIPPED | OUTPATIENT
Start: 2022-08-16 | End: 2022-09-23

## 2022-08-16 RX ORDER — TRIAMCINOLONE ACETONIDE 5 MG/G
OINTMENT TOPICAL
Qty: 15 G | Refills: 0 | Status: SHIPPED | OUTPATIENT
Start: 2022-08-16 | End: 2023-03-14

## 2022-08-16 ASSESSMENT — ASTHMA QUESTIONNAIRES: ACT_TOTALSCORE: 25

## 2022-08-16 ASSESSMENT — PAIN SCALES - GENERAL: PAINLEVEL: NO PAIN (0)

## 2022-08-16 NOTE — LETTER
My Asthma Action Plan    Name: Estuardo Trevino   YOB: 2000  Date: 8/16/2022   My doctor: Ekta Yee MD   My clinic: [facility]        My Rescue Medicine:   Albuterol inhaler (Proair/Ventolin/Proventil HFA)  2-4 puffs EVERY 4 HOURS as needed. Use a spacer if recommended by your provider.   My Asthma Severity:   Intermittent / Exercise Induced  Know your asthma triggers: pollens             GREEN ZONE   Good Control    I feel good    No cough or wheeze    Can work, sleep and play without asthma symptoms       Take your asthma control medicine every day.     1. If exercise triggers your asthma, take your rescue medication    15 minutes before exercise or sports, and    During exercise if you have asthma symptoms  2. Spacer to use with inhaler: If you have a spacer, make sure to use it with your inhaler             YELLOW ZONE Getting Worse  I have ANY of these:    I do not feel good    Cough or wheeze    Chest feels tight    Wake up at night   1. Keep taking your Green Zone medications  2. Start taking your rescue medicine:    every 20 minutes for up to 1 hour. Then every 4 hours for 24-48 hours.  3. If you stay in the Yellow Zone for more than 12-24 hours, contact your doctor.  4. If you do not return to the Green Zone in 12-24 hours or you get worse, start taking your oral steroid medicine if prescribed by your provider.           RED ZONE Medical Alert - Get Help  I have ANY of these:    I feel awful    Medicine is not helping    Breathing getting harder    Trouble walking or talking    Nose opens wide to breathe       1. Take your rescue medicine NOW  2. If your provider has prescribed an oral steroid medicine, start taking it NOW  3. Call your doctor NOW  4. If you are still in the Red Zone after 20 minutes and you have not reached your doctor:    Take your rescue medicine again and    Call 911 or go to the emergency room right away    See your regular doctor  within 2 weeks of an Emergency Room or Urgent Care visit for follow-up treatment.          Annual Reminders:  Meet with Asthma Educator,  Flu Shot in the Fall, consider Pneumonia Vaccination for patients with asthma (aged 19 and older).    Pharmacy:    [facility] - [address] [id number] - [place], [place] - [address] AT [id number] - [place], [place] - [address] AT [facility] & [address] [id number] - [place], [place] - [address] AT [facility] & [address]    Electronically signed by Ekta Yee MD   Date: 08/16/22                    Asthma Triggers  How To Control Things That Make Your Asthma Worse    Triggers are things that make your asthma worse.  Look at the list below to help you find your triggers and   what you can do about them. You can help prevent asthma flare-ups by staying away from your triggers.      Trigger                                                          What you can do   Cigarette Smoke  Tobacco smoke can make asthma worse. Do not allow smoking in your home, car or around you.  Be sure no one smokes at a child s day care or school.  If you smoke, ask your health care provider for ways to help you quit.  Ask family members to quit too.  Ask your health care provider for a referral to Quit Plan to help you quit smoking, or call 6-273-069-PLAN.     Colds, Flu, Bronchitis  These are common triggers of asthma. Wash your hands often.  Don t touch your eyes, nose or mouth.  Get a flu shot every year.     Dust Mites  These are tiny bugs that live in cloth or carpet. They are too small to see. Wash sheets and blankets in hot water every week.   Encase pillows and mattress in dust mite proof covers.  Avoid having carpet if you can. If you have carpet, vacuum weekly.   Use a dust mask and HEPA vacuum.   Pollen and Outdoor Mold  Some  people are allergic to trees, grass, or weed pollen, or molds. Try to keep your windows closed.  Limit time out doors when pollen count is high.   Ask you health care provider about taking medicine during allergy season.     Animal Dander  Some people are allergic to skin flakes, urine or saliva from pets with fur or feathers. Keep pets with fur or feathers out of your home.    If you can t keep the pet outdoors, then keep the pet out of your bedroom.  Keep the bedroom door closed.  Keep pets off cloth furniture and away from stuffed toys.     Mice, Rats, and Cockroaches  Some people are allergic to the waste from these pests.   Cover food and garbage.  Clean up spills and food crumbs.  Store grease in the refrigerator.   Keep food out of the bedroom.   Indoor Mold  This can be a trigger if your home has high moisture. Fix leaking faucets, pipes, or other sources of water.   Clean moldy surfaces.  Dehumidify basement if it is damp and smelly.   Smoke, Strong Odors, and Sprays  These can reduce air quality. Stay away from strong odors and sprays, such as perfume, powder, hair spray, paints, smoke incense, paint, cleaning products, candles and new carpet.   Exercise or Sports  Some people with asthma have this trigger. Be active!  Ask your doctor about taking medicine before sports or exercise to prevent symptoms.    Warm up for 5-10 minutes before and after sports or exercise.     Other Triggers of Asthma  Cold air:  Cover your nose and mouth with a scarf.  Sometimes laughing or crying can be a trigger.  Some medicines and food can trigger asthma.

## 2022-08-16 NOTE — PROGRESS NOTES
"  Assessment & Plan     1. Rash    - triamcinolone (KENALOG) 0.5 % external ointment; Apply to the affected area twice daily for 10 days and as needed there after  Dispense: 15 g; Refill: 0  - cetirizine (ZYRTEC) 5 MG tablet; Take 1 tablet (5 mg) by mouth daily for 30 days  Dispense: 30 tablet; Refill: 0  recommend daily 10-15 minute bath in lukewarm water - limit use of soap or mild skin cleanser to once or twice per week as needed  - recommend application of bland emollient such as Vanicream, Aquaphor, or Vaseline to all skin after bathing and frequently throughout the day    2. Mild intermittent asthma without complication  Controlled.  ACT completed and ACP given to patient.    Return in about 2 weeks (around 8/30/2022) for Follow up, in person, using a video visit.    Ekta Yee MD  [facility] MARGE Marte is a 22 year old, presenting for the following health issues:  New Patient and Rash      History of Present Illness       Reason for visit:  Rash  Symptom onset:  1-3 days ago    He eats 2-3 servings of fruits and vegetables daily.He consumes 0 sweetened beverage(s) daily.He exercises with enough effort to increase his heart rate 20 to 29 minutes per day.  He exercises with enough effort to increase his heart rate 4 days per week.   He is taking medications regularly.     Rash at the back of elbow bilaterally that started yesterday.  It is itchy.  He has applied over-the-counter hydrocortisone cream.  He denies exposure to new detergents, new foods or new food.  Patient is allergic to cats.  Patient does have history of asthma.  It is controlled at this time.  He has not needed his inhaler in months         Review of Systems   Constitutional, HEENT, cardiovascular, pulmonary, gi and gu systems are negative, except as otherwise noted.      Objective    /83   Pulse 80   Temp 98.3  F (36.8  C) (Temporal)   Resp 18   Ht 1.635 m (5' 4.37\")   Wt 68.5 kg (151 " "Requested Prescriptions   Pending Prescriptions Disp Refills     chlorthalidone (HYGROTON) 25 MG tablet [Pharmacy Med Name: CHLORTHALIDONE 25MG TABLETS] 90 tablet 0     Sig: TAKE 1 TABLET EVERY DAY    Diuretics (Including Combos) Protocol Failed    9/18/2018  8:05 PM       Failed - Blood pressure under 140/90 in past 12 months    BP Readings from Last 3 Encounters:   09/10/18 158/66   08/24/18 150/76   08/13/18 140/62     Next 5 appointments (look out 90 days)     Sep 24, 2018 11:00 AM CDT   [facility]    [address] [facility] [address]   [phone]            Nov 02, 2018 11:00 AM CDT   [facility] Visit with José Vogel [facility]   [facility]    [address] [place] [address]  [place] [address]   [phone]                          Passed - Recent (12 mo) or future (30 days) visit within the authorizing provider's specialty    Patient had office visit in the last 12 months or has a visit in the next 30 days with authorizing provider or within the authorizing provider's specialty.  See \"Patient Info\" tab in inbasket, or \"Choose Columns\" in Meds & Orders section of the refill encounter.           Passed - Patient is age 18 or older       Passed - No active pregancy on record       Passed - Normal serum creatinine on file in past 12 months    Recent Labs   Lab Test  09/10/18   1205   CR  0.62             Passed - Normal serum potassium on file in past 12 months    Recent Labs   Lab Test  09/10/18   1205   POTASSIUM  3.8                   Passed - Normal serum sodium on file in past 12 months    Recent Labs   Lab Test  09/10/18   1205   NA  140             Passed - No positive pregnancy test in past 12 months        Prescription approved per [facility].  Madelaine Seo RN      " lb)   SpO2 97%   BMI 25.62 kg/m    Body mass index is 25.62 kg/m .  Physical Exam   GENERAL: healthy, alert and no distress  MS: no gross musculoskeletal defects noted, no edema  Skin: Erythematous plaque on the dorsal surface of the mid arm bilaterally

## 2022-08-16 NOTE — PATIENT INSTRUCTIONS
Rik Marte,      I sent your prescriptions to your pharmacy.      recommend daily 10-15 minute bath in lukewarm water - limit use of soap or mild skin cleanser to once or twice per week as needed  - recommend application of bland emollient such as Vanicream, Aquaphor, or Vaseline to all skin after bathing and frequently throughout the day  - apply steroid cream to affected areas  twice daily as needed             For your convenience, test results are released as soon as they are available  Please allow 1-2 business days for me to send you a comment about your results.  If not done so, I encourage you to login into [employer] ([email or web address] to review your results in real time.     If you have any questions or concerns, please feel free to call us at [phone].    Sincerely,    Dr. Yee    Did you know?      You can schedule a video visit for follow-up appointments as well as future appointments for certain conditions.  Please see the below link.     [email or web address]    If you have not already done so,  I encourage you to sign up for [facility] ([email or web address]).  This will allow you to review your results, securely communicate with a provider, and schedule virtual visits as well.

## 2022-08-21 NOTE — TELEPHONE ENCOUNTER
Looking for MRI results of knee.    Lesly Dyson MA     No reported hypothyroidism by patient. Per pharmacy, patient is on levothyroxine 0.1 mg QD.   - obtain collateral to confirm, patient is poor historian  - continue home dose once confirmed No reported hypothyroidism by patient. Per pharmacy, patient is on levothyroxine 0.1 mg QD.   - obtain collateral to confirm if possible, patient is poor historian  - continue home dose once confirmed hypomagnesemia and hypokalemia - correct as warranted

## 2022-08-24 ENCOUNTER — MYC MEDICAL ADVICE (OUTPATIENT)
Dept: FAMILY MEDICINE | Facility: CLINIC | Age: 22
End: 2022-08-24

## 2022-08-24 DIAGNOSIS — R21 RASH: Primary | ICD-10-CM

## 2022-08-24 RX ORDER — PREDNISONE 20 MG/1
TABLET ORAL
Qty: 20 TABLET | Refills: 0 | Status: SHIPPED | OUTPATIENT
Start: 2022-08-24 | End: 2023-03-14

## 2022-08-26 ENCOUNTER — TRANSFERRED RECORDS (OUTPATIENT)
Dept: HEALTH INFORMATION MANAGEMENT | Facility: CLINIC | Age: 22
End: 2022-08-26

## 2022-09-16 ENCOUNTER — MYC MEDICAL ADVICE (OUTPATIENT)
Dept: FAMILY MEDICINE | Facility: CLINIC | Age: 22
End: 2022-09-16

## 2022-09-23 ENCOUNTER — MYC MEDICAL ADVICE (OUTPATIENT)
Dept: FAMILY MEDICINE | Facility: CLINIC | Age: 22
End: 2022-09-23

## 2022-09-23 DIAGNOSIS — R21 RASH: ICD-10-CM

## 2022-09-23 RX ORDER — CETIRIZINE HYDROCHLORIDE 5 MG/1
5 TABLET ORAL DAILY
Qty: 30 TABLET | Refills: 4 | Status: SHIPPED | OUTPATIENT
Start: 2022-09-23 | End: 2023-03-14

## 2022-09-30 NOTE — TELEPHONE ENCOUNTER
Would you like patient to contact derm again with these symptoms?  Also, please address question about zyrtec.    Karlee COONEYN, RN

## 2023-03-13 NOTE — PROGRESS NOTES
"  Assessment & Plan     Palpitations  Normal, EKG, await labwork  Recommended to go home and try to see if we can bring this one with caffiene intake  If so, could put on a ziopatch to see if we can catch this rhythm.    - **CBC with platelets FUTURE 2mo; Future  - **Comprehensive metabolic panel FUTURE 2mo; Future  - **TSH with free T4 reflex FUTURE 2mo; Future  - EKG 12-lead complete w/read - Clinics  - **CBC with platelets FUTURE 2mo  - **Comprehensive metabolic panel FUTURE 2mo  - **TSH with free T4 reflex FUTURE 2mo    Need for diphtheria-tetanus-pertussis (Tdap) vaccine  given      956}     BMI:   Estimated body mass index is 27.04 kg/m  as calculated from the following:    Height as of this encounter: 1.638 m (5' 4.5\").    Weight as of this encounter: 72.6 kg (160 lb).           No follow-ups on file.    Bonny Martin MD  [facility]   Estuardo is a 22 year old, presenting for the following health issues:  Anxiety      History of Present Illness       Reason for visit:  Random panic attacks  Symptom onset:  More than a month  Symptoms include:  Random panic attacks with chills  Symptom intensity:  Moderate  Symptom progression:  Staying the same  Had these symptoms before:  Yes  Has tried/received treatment for these symptoms:  No  What makes it worse:  Thinking there is something physically wrong with me  What makes it better:  Someone helping calm me down    He eats 2-3 servings of fruits and vegetables daily.He consumes 0 sweetened beverage(s) daily.He exercises with enough effort to increase his heart rate 20 to 29 minutes per day.  He exercises with enough effort to increase his heart rate 4 days per week.   He is taking medications regularly.     Had 2 episodes in January while on vacation in [place] where his heart just started racing and he felt sob. First occurred in the middle of the night, it woke him up from a sleep, second when trying to fall asleep  Most " "recent one this month when he was home watching a comedy  The episodes occur suddenly and last 10-15 minutes  He did have a mountain dew earlier in the day with the most recent one.   He does have some anxiety does not get the sense of doom with the episodes.   His heart is beating regularly, just fast.   He was thinking it may be panic attacks?      Review of Systems   Constitutional, HEENT, cardiovascular, pulmonary, gi and gu systems are negative, except as otherwise noted.      Objective    /89   Pulse 70   Temp 98.5  F (36.9  C) (Oral)   Resp 16   Ht 1.638 m (5' 4.5\")   Wt 72.6 kg (160 lb)   SpO2 98%   BMI 27.04 kg/m    Body mass index is 27.04 kg/m .  Physical Exam   GENERAL: healthy, alert and no distress  NECK: no adenopathy, no asymmetry, masses, or scars and thyroid normal to palpation  RESP: lungs clear to auscultation - no rales, rhonchi or wheezes  CV: regular rate and rhythm, normal S1 S2, no S3 or S4, no murmur, click or rub, no peripheral edema and peripheral pulses strong  MS: no gross musculoskeletal defects noted, no edema    EKG - Reviewed and interpreted by me appears normal, NSR, normal axis, normal intervals, no acute ST/T changes c/w ischemia, no LVH by voltage criteria, unchanged from previous tracings                "

## 2023-03-14 ENCOUNTER — OFFICE VISIT (OUTPATIENT)
Dept: FAMILY MEDICINE | Facility: CLINIC | Age: 23
End: 2023-03-14
Payer: COMMERCIAL

## 2023-03-14 VITALS
HEIGHT: 65 IN | DIASTOLIC BLOOD PRESSURE: 89 MMHG | BODY MASS INDEX: 26.66 KG/M2 | OXYGEN SATURATION: 98 % | TEMPERATURE: 98.5 F | WEIGHT: 160 LBS | HEART RATE: 70 BPM | RESPIRATION RATE: 16 BRPM | SYSTOLIC BLOOD PRESSURE: 131 MMHG

## 2023-03-14 DIAGNOSIS — R00.2 PALPITATIONS: Primary | ICD-10-CM

## 2023-03-14 DIAGNOSIS — Z23 NEED FOR DIPHTHERIA-TETANUS-PERTUSSIS (TDAP) VACCINE: ICD-10-CM

## 2023-03-14 LAB
ALBUMIN SERPL-MCNC: 3.9 G/DL (ref 3.4–5)
ALP SERPL-CCNC: 71 U/L (ref 40–150)
ALT SERPL W P-5'-P-CCNC: 37 U/L (ref 0–70)
ANION GAP SERPL CALCULATED.3IONS-SCNC: 4 MMOL/L (ref 3–14)
AST SERPL W P-5'-P-CCNC: 19 U/L (ref 0–45)
BILIRUB SERPL-MCNC: 0.8 MG/DL (ref 0.2–1.3)
BUN SERPL-MCNC: 11 MG/DL (ref 7–30)
CALCIUM SERPL-MCNC: 9.5 MG/DL (ref 8.5–10.1)
CHLORIDE BLD-SCNC: 106 MMOL/L (ref 94–109)
CO2 SERPL-SCNC: 27 MMOL/L (ref 20–32)
CREAT SERPL-MCNC: 0.83 MG/DL (ref 0.66–1.25)
ERYTHROCYTE [DISTWIDTH] IN BLOOD BY AUTOMATED COUNT: 12.8 % (ref 10–15)
GFR SERPL CREATININE-BSD FRML MDRD: >90 ML/MIN/1.73M2
GLUCOSE BLD-MCNC: 89 MG/DL (ref 70–99)
HCT VFR BLD AUTO: 47.2 % (ref 40–53)
HGB BLD-MCNC: 16.6 G/DL (ref 13.3–17.7)
MCH RBC QN AUTO: 30.9 PG (ref 26.5–33)
MCHC RBC AUTO-ENTMCNC: 35.2 G/DL (ref 31.5–36.5)
MCV RBC AUTO: 88 FL (ref 78–100)
PLATELET # BLD AUTO: 285 10E3/UL (ref 150–450)
POTASSIUM BLD-SCNC: 3.7 MMOL/L (ref 3.4–5.3)
PROT SERPL-MCNC: 7.2 G/DL (ref 6.8–8.8)
RBC # BLD AUTO: 5.37 10E6/UL (ref 4.4–5.9)
SODIUM SERPL-SCNC: 137 MMOL/L (ref 133–144)
T4 FREE SERPL-MCNC: 1.1 NG/DL (ref 0.76–1.46)
TSH SERPL DL<=0.005 MIU/L-ACNC: 4.27 MU/L (ref 0.4–4)
WBC # BLD AUTO: 7.4 10E3/UL (ref 4–11)

## 2023-03-14 PROCEDURE — 99213 OFFICE O/P EST LOW 20 MIN: CPT | Mod: 25 | Performed by: FAMILY MEDICINE

## 2023-03-14 PROCEDURE — 93000 ELECTROCARDIOGRAM COMPLETE: CPT | Performed by: FAMILY MEDICINE

## 2023-03-14 PROCEDURE — 90715 TDAP VACCINE 7 YRS/> IM: CPT | Performed by: FAMILY MEDICINE

## 2023-03-14 PROCEDURE — 84439 ASSAY OF FREE THYROXINE: CPT | Performed by: FAMILY MEDICINE

## 2023-03-14 PROCEDURE — 84443 ASSAY THYROID STIM HORMONE: CPT | Performed by: FAMILY MEDICINE

## 2023-03-14 PROCEDURE — 85027 COMPLETE CBC AUTOMATED: CPT | Performed by: FAMILY MEDICINE

## 2023-03-14 PROCEDURE — 80053 COMPREHEN METABOLIC PANEL: CPT | Performed by: FAMILY MEDICINE

## 2023-03-14 PROCEDURE — 36415 COLL VENOUS BLD VENIPUNCTURE: CPT | Performed by: FAMILY MEDICINE

## 2023-03-14 PROCEDURE — 90471 IMMUNIZATION ADMIN: CPT | Performed by: FAMILY MEDICINE

## 2023-03-14 ASSESSMENT — PATIENT HEALTH QUESTIONNAIRE - PHQ9
5. POOR APPETITE OR OVEREATING: NOT AT ALL
SUM OF ALL RESPONSES TO PHQ QUESTIONS 1-9: 0

## 2023-03-14 ASSESSMENT — ANXIETY QUESTIONNAIRES
3. WORRYING TOO MUCH ABOUT DIFFERENT THINGS: NOT AT ALL
GAD7 TOTAL SCORE: 0
5. BEING SO RESTLESS THAT IT IS HARD TO SIT STILL: NOT AT ALL
7. FEELING AFRAID AS IF SOMETHING AWFUL MIGHT HAPPEN: NOT AT ALL
1. FEELING NERVOUS, ANXIOUS, OR ON EDGE: NOT AT ALL
6. BECOMING EASILY ANNOYED OR IRRITABLE: NOT AT ALL
IF YOU CHECKED OFF ANY PROBLEMS ON THIS QUESTIONNAIRE, HOW DIFFICULT HAVE THESE PROBLEMS MADE IT FOR YOU TO DO YOUR WORK, TAKE CARE OF THINGS AT HOME, OR GET ALONG WITH OTHER PEOPLE: NOT DIFFICULT AT ALL
2. NOT BEING ABLE TO STOP OR CONTROL WORRYING: NOT AT ALL
GAD7 TOTAL SCORE: 0

## 2023-03-14 ASSESSMENT — ASTHMA QUESTIONNAIRES
ACT_TOTALSCORE: 25
QUESTION_2 LAST FOUR WEEKS HOW OFTEN HAVE YOU HAD SHORTNESS OF BREATH: NOT AT ALL
QUESTION_4 LAST FOUR WEEKS HOW OFTEN HAVE YOU USED YOUR RESCUE INHALER OR NEBULIZER MEDICATION (SUCH AS ALBUTEROL): NOT AT ALL
QUESTION_1 LAST FOUR WEEKS HOW MUCH OF THE TIME DID YOUR ASTHMA KEEP YOU FROM GETTING AS MUCH DONE AT WORK, SCHOOL OR AT HOME: NONE OF THE TIME
QUESTION_5 LAST FOUR WEEKS HOW WOULD YOU RATE YOUR ASTHMA CONTROL: COMPLETELY CONTROLLED
ACT_TOTALSCORE: 25
QUESTION_3 LAST FOUR WEEKS HOW OFTEN DID YOUR ASTHMA SYMPTOMS (WHEEZING, COUGHING, SHORTNESS OF BREATH, CHEST TIGHTNESS OR PAIN) WAKE YOU UP AT NIGHT OR EARLIER THAN USUAL IN THE MORNING: NOT AT ALL

## 2023-03-14 ASSESSMENT — PAIN SCALES - GENERAL: PAINLEVEL: NO PAIN (0)

## 2023-03-14 NOTE — NURSING NOTE
Prior to immunization administration, verified patients identity using patient s name and date of birth. Please see Immunization Activity for additional information.     Screening Questionnaire for Adult Immunization    Are you sick today?   No   Do you have allergies to medications, food, a vaccine component or latex?   No   Have you ever had a serious reaction after receiving a vaccination?   No   Do you have a long-term health problem with heart, lung, kidney, or metabolic disease (e.g., diabetes), asthma, a blood disorder, no spleen, complement component deficiency, a cochlear implant, or a spinal fluid leak?  Are you on long-term aspirin therapy?   No   Do you have cancer, leukemia, HIV/AIDS, or any other immune system problem?   No   Do you have a parent, brother, or sister with an immune system problem?   No   In the past 3 months, have you taken medications that affect  your immune system, such as prednisone, other steroids, or anticancer drugs; drugs for the treatment of rheumatoid arthritis, Crohn s disease, or psoriasis; or have you had radiation treatments?   No   Have you had a seizure, or a brain or other nervous system problem?   No   During the past year, have you received a transfusion of blood or blood    products, or been given immune (gamma) globulin or antiviral drug?   No   For women: Are you pregnant or is there a chance you could become       pregnant during the next month?   No   Have you received any vaccinations in the past 4 weeks?   No     Immunization questionnaire answers were all negative.        Per orders of Dr. Jessica, injection of tdap given by Lesly Dyson MA. Patient instructed to remain in clinic for 15 minutes afterwards, and to report any adverse reaction to me immediately.       Screening performed by Lesly Dyson MA on 3/14/2023 at 7:54 AM.

## 2023-06-02 ENCOUNTER — HEALTH MAINTENANCE LETTER (OUTPATIENT)
Age: 23
End: 2023-06-02

## 2023-09-20 ENCOUNTER — APPOINTMENT (OUTPATIENT)
Dept: URBAN - METROPOLITAN AREA CLINIC 256 | Age: 23
Setting detail: DERMATOLOGY
End: 2023-09-21

## 2023-09-20 VITALS — WEIGHT: 155 LBS | HEIGHT: 64 IN

## 2023-09-20 DIAGNOSIS — Z71.89 OTHER SPECIFIED COUNSELING: ICD-10-CM

## 2023-09-20 DIAGNOSIS — L57.8 OTHER SKIN CHANGES DUE TO CHRONIC EXPOSURE TO NONIONIZING RADIATION: ICD-10-CM

## 2023-09-20 DIAGNOSIS — D49.2 NEOPLASM OF UNSPECIFIED BEHAVIOR OF BONE, SOFT TISSUE, AND SKIN: ICD-10-CM

## 2023-09-20 DIAGNOSIS — D22 MELANOCYTIC NEVI: ICD-10-CM

## 2023-09-20 PROBLEM — D22.5 MELANOCYTIC NEVI OF TRUNK: Status: ACTIVE | Noted: 2023-09-20

## 2023-09-20 PROBLEM — D22.62 MELANOCYTIC NEVI OF LEFT UPPER LIMB, INCLUDING SHOULDER: Status: ACTIVE | Noted: 2023-09-20

## 2023-09-20 PROBLEM — D22.72 MELANOCYTIC NEVI OF LEFT LOWER LIMB, INCLUDING HIP: Status: ACTIVE | Noted: 2023-09-20

## 2023-09-20 PROBLEM — D22.61 MELANOCYTIC NEVI OF RIGHT UPPER LIMB, INCLUDING SHOULDER: Status: ACTIVE | Noted: 2023-09-20

## 2023-09-20 PROBLEM — D22.71 MELANOCYTIC NEVI OF RIGHT LOWER LIMB, INCLUDING HIP: Status: ACTIVE | Noted: 2023-09-20

## 2023-09-20 PROCEDURE — 11102 TANGNTL BX SKIN SINGLE LES: CPT

## 2023-09-20 PROCEDURE — OTHER EDUCATIONAL RESOURCES PROVIDED: OTHER

## 2023-09-20 PROCEDURE — OTHER COUNSELING: OTHER

## 2023-09-20 PROCEDURE — OTHER MIPS QUALITY: OTHER

## 2023-09-20 PROCEDURE — OTHER PHOTO-DOCUMENTATION: OTHER

## 2023-09-20 PROCEDURE — OTHER PATIENT SPECIFIC COUNSELING: OTHER

## 2023-09-20 PROCEDURE — 11103 TANGNTL BX SKIN EA SEP/ADDL: CPT

## 2023-09-20 PROCEDURE — OTHER BIOPSY BY SHAVE METHOD: OTHER

## 2023-09-20 PROCEDURE — 99203 OFFICE O/P NEW LOW 30 MIN: CPT | Mod: 25

## 2023-09-20 ASSESSMENT — LOCATION SIMPLE DESCRIPTION DERM
LOCATION SIMPLE: LEFT THIGH
LOCATION SIMPLE: LEFT UPPER ARM
LOCATION SIMPLE: ABDOMEN
LOCATION SIMPLE: CHEST
LOCATION SIMPLE: RIGHT FOREHEAD
LOCATION SIMPLE: RIGHT UPPER ARM
LOCATION SIMPLE: RIGHT THIGH
LOCATION SIMPLE: RIGHT UPPER BACK

## 2023-09-20 ASSESSMENT — LOCATION ZONE DERM
LOCATION ZONE: LEG
LOCATION ZONE: ARM
LOCATION ZONE: TRUNK
LOCATION ZONE: FACE

## 2023-09-20 ASSESSMENT — LOCATION DETAILED DESCRIPTION DERM
LOCATION DETAILED: RIGHT MEDIAL UPPER BACK
LOCATION DETAILED: RIGHT MEDIAL INFERIOR CHEST
LOCATION DETAILED: RIGHT ANTERIOR PROXIMAL THIGH
LOCATION DETAILED: LEFT ANTERIOR PROXIMAL UPPER ARM
LOCATION DETAILED: RIGHT INFERIOR MEDIAL FOREHEAD
LOCATION DETAILED: LEFT LATERAL ABDOMEN
LOCATION DETAILED: EPIGASTRIC SKIN
LOCATION DETAILED: LEFT ANTERIOR PROXIMAL THIGH
LOCATION DETAILED: PERIUMBILICAL SKIN
LOCATION DETAILED: RIGHT ANTERIOR DISTAL UPPER ARM

## 2023-09-20 NOTE — PROCEDURE: PATIENT SPECIFIC COUNSELING
Detail Level: Zone
- assured patient not to worry about the risk of these lesions\\n- discussed that there is a high likelihood that the pathology will show benign nevi with atypia\\n- discussed low risk of melanoma but discussed that there are slight irregularities in both lesions

## 2023-12-28 NOTE — PROGRESS NOTES
DIAGNOSIS:   1. ACL tear  2. Intact medial meniscal capsular separation  3. Arthrofibrosis    PROCEDURES:  1. 12/21/2019: ACL reconstruction bone tendon bone autograft  2. 3/22/2019: Manipulation under anesthesia w/ arthroscopic lysis of adhesions    HISTORY:  4 month status post bone tendon bone autograft ACL reconstruction and now s/p lysis of adhesions for postoperative arthrofibrosis. Continues to have motion limitations and pain limiting rehab participation. Slightly improved postop. Has been using cpm and taking muscle relaxant as prescribed, also took medrol dosepak after surg.     General: Awake, Alert, and oriented. Articulates and communicates with a normal affect     Right lower Extremity:    Incisions well healed without evidence of infection    Normal post-operative effusion and ecchymosis    Range of motion 5-100 degrees    Stable to varus and valgus Lachman 0 no pivot shift    Neurovascularly intact    IMAGING: no new imaging    ASSESSMENT:  1. 4 months following ACL reconstruction bone tendon bone autograft    PLAN:     Continue w/ therapy as needed, can transition to home plan as desired    Ok for swimming, biking, straightline running. Avoid side to side sports at this time    Discussed at length that we anticipate motion to improve over time. Can take a year or more to fully recover. Most important to continue w/ therapy and exercise as tolerated to strengthen, coordinate muscles and continue w/ motion improvement.     Ok to be done w/ cpm    F/u 3 months    Real Alex MD  PGY-5, Orthopaedic Surgery  [phone]           Answers for HPI/ROS submitted by the patient on 4/15/2019   General Symptoms: No  Skin Symptoms: No  HENT Symptoms: No  EYE SYMPTOMS: No  HEART SYMPTOMS: No  LUNG SYMPTOMS: No  INTESTINAL SYMPTOMS: No  URINARY SYMPTOMS: No  REPRODUCTIVE SYMPTOMS: No  SKELETAL SYMPTOMS: Yes  BLOOD SYMPTOMS: No  NERVOUS SYSTEM SYMPTOMS: No  MENTAL HEALTH SYMPTOMS: No  PEDS Symptoms:  Ramu Song  Colon/Rectal Surgery  [address] [facility], [place] [address]  Phone: [phone]  Fax: [phone]  Follow Up Time: 2 weeks    Frederick Avila  Gastroenterology  [address] [place] [address] [place] [address]  Phone: [phone]  Fax: [phone]  Follow Up Time: 2 weeks   No  Back pain: No  Muscle aches: Yes  Neck pain: No  Swollen joints: Yes  Joint pain: No  Bone pain: No  Muscle cramps: No  Muscle weakness: No  Joint stiffness: No  Bone fracture: No     Ramu Song  Colon/Rectal Surgery  [address] [facility], [place] [address]  Phone: [phone]  Fax: [phone]  Follow Up Time: 2 weeks    Frederick Avila  Gastroenterology  [address] [place] [address] [place] [address]  Phone: [phone]  Fax: [phone]  Follow Up Time: 2 weeks

## 2024-06-24 DIAGNOSIS — J45.21 EXACERBATION OF INTERMITTENT ASTHMA, UNSPECIFIED ASTHMA SEVERITY: ICD-10-CM

## 2024-06-24 DIAGNOSIS — J45.21 INTERMITTENT ASTHMA, WITH ACUTE EXACERBATION: ICD-10-CM

## 2024-06-24 RX ORDER — ALBUTEROL SULFATE 90 UG/1
AEROSOL, METERED RESPIRATORY (INHALATION)
Qty: 8.5 G | Refills: 3 | Status: SHIPPED | OUTPATIENT
Start: 2024-06-24

## 2024-06-26 ENCOUNTER — TELEPHONE (OUTPATIENT)
Dept: FAMILY MEDICINE | Facility: CLINIC | Age: 24
End: 2024-06-26
Payer: COMMERCIAL

## 2024-06-26 DIAGNOSIS — J45.20 MILD INTERMITTENT ASTHMA WITHOUT COMPLICATION: Primary | ICD-10-CM

## 2024-06-26 NOTE — TELEPHONE ENCOUNTER
Drug change request fax from pharmacy:    Regarding Rx: fluticasone (FLOVENT DISKUS) 100 MCG/ACT inhaler    Message from pharmacy: Drug not covered by pt plan. The preferred alt is ARNUITYELLA, ASMANEX, ASMAEXHFA, QVARRED INHALER. Please call/fax the pharmacy to change the Rx along with strength, quantity, directions & refills     Bebe QUEZADA,    [facility]

## 2024-06-30 ENCOUNTER — HEALTH MAINTENANCE LETTER (OUTPATIENT)
Age: 24
End: 2024-06-30

## 2024-07-24 ENCOUNTER — TRANSFERRED RECORDS (OUTPATIENT)
Dept: FAMILY MEDICINE | Facility: CLINIC | Age: 24
End: 2024-07-24
Payer: COMMERCIAL

## 2024-10-21 ENCOUNTER — MYC MEDICAL ADVICE (OUTPATIENT)
Dept: FAMILY MEDICINE | Facility: CLINIC | Age: 24
End: 2024-10-21
Payer: COMMERCIAL

## 2024-10-22 ENCOUNTER — OFFICE VISIT (OUTPATIENT)
Dept: FAMILY MEDICINE | Facility: CLINIC | Age: 24
End: 2024-10-22
Payer: COMMERCIAL

## 2024-10-22 VITALS
HEIGHT: 65 IN | OXYGEN SATURATION: 99 % | TEMPERATURE: 97.6 F | HEART RATE: 77 BPM | WEIGHT: 161 LBS | DIASTOLIC BLOOD PRESSURE: 82 MMHG | BODY MASS INDEX: 26.82 KG/M2 | SYSTOLIC BLOOD PRESSURE: 131 MMHG

## 2024-10-22 DIAGNOSIS — H10.9 BACTERIAL CONJUNCTIVITIS OF RIGHT EYE: Primary | ICD-10-CM

## 2024-10-22 PROCEDURE — 99213 OFFICE O/P EST LOW 20 MIN: CPT | Performed by: NURSE PRACTITIONER

## 2024-10-22 RX ORDER — POLYMYXIN B SULFATE AND TRIMETHOPRIM 1; 10000 MG/ML; [USP'U]/ML
2 SOLUTION OPHTHALMIC EVERY 4 HOURS
Qty: 10 ML | Refills: 0 | Status: SHIPPED | OUTPATIENT
Start: 2024-10-22 | End: 2024-10-29

## 2024-10-22 RX ORDER — NEOMYCIN/POLYMYXIN B/HYDROCORT 3.5-10K-1
2 SUSPENSION, DROPS(FINAL DOSAGE FORM)(ML) OPHTHALMIC (EYE) 4 TIMES DAILY
Qty: 7.5 ML | Refills: 0 | Status: SHIPPED | OUTPATIENT
Start: 2024-10-22 | End: 2024-10-22

## 2024-10-22 ASSESSMENT — ASTHMA QUESTIONNAIRES
ACT_TOTALSCORE: 25
QUESTION_2 LAST FOUR WEEKS HOW OFTEN HAVE YOU HAD SHORTNESS OF BREATH: NOT AT ALL
QUESTION_1 LAST FOUR WEEKS HOW MUCH OF THE TIME DID YOUR ASTHMA KEEP YOU FROM GETTING AS MUCH DONE AT WORK, SCHOOL OR AT HOME: NONE OF THE TIME
ACT_TOTALSCORE: 25
QUESTION_5 LAST FOUR WEEKS HOW WOULD YOU RATE YOUR ASTHMA CONTROL: COMPLETELY CONTROLLED
QUESTION_4 LAST FOUR WEEKS HOW OFTEN HAVE YOU USED YOUR RESCUE INHALER OR NEBULIZER MEDICATION (SUCH AS ALBUTEROL): NOT AT ALL
QUESTION_3 LAST FOUR WEEKS HOW OFTEN DID YOUR ASTHMA SYMPTOMS (WHEEZING, COUGHING, SHORTNESS OF BREATH, CHEST TIGHTNESS OR PAIN) WAKE YOU UP AT NIGHT OR EARLIER THAN USUAL IN THE MORNING: NOT AT ALL

## 2024-10-22 ASSESSMENT — PAIN SCALES - GENERAL: PAINLEVEL: NO PAIN (0)

## 2024-10-22 ASSESSMENT — ENCOUNTER SYMPTOMS: EYE PAIN: 1

## 2024-10-22 NOTE — PROGRESS NOTES
"  Assessment & Plan     Bacterial conjunctivitis of right eye  Follow-up if not improving  - polymixin b-trimethoprim (POLYTRIM) 22688-6.1 UNIT/ML-% ophthalmic solution; Place 2 drops into the right eye every 4 hours for 7 days.      BMI  Estimated body mass index is 27.21 kg/m  as calculated from the following:    Height as of this encounter: 1.638 m (5' 4.5\").    Weight as of this encounter: 73 kg (161 lb).         Geraldine Marte is a 24 year old, presenting for the following health issues:  Eye Problem        10/22/2024     8:43 AM   Additional Questions   Roomed by paige     History of Present Illness       Reason for visit:  Pink eye  Symptom onset:  1-3 days ago   He is taking medications regularly.       Eye symptoms started yesterday  Right eye feels raw  Eye is red  Discharge coming out of his eye  Did artificial tear drops yesterday and warm compress yesterday, didn't seem to help, eye worse today  Vision okay  No headache, fever, or malaise  Hasn't been around anyone with pink eye he is aware of but did sleep on a friend's futon recently  Doesn't wear contact lenses        Review of Systems  Constitutional, HEENT, cardiovascular, pulmonary, gi and gu systems are negative, except as otherwise noted.      Objective    /82   Pulse 77   Temp 97.6  F (36.4  C)   Ht 1.638 m (5' 4.5\")   Wt 73 kg (161 lb)   SpO2 99%   BMI 27.21 kg/m    Body mass index is 27.21 kg/m .  Physical Exam   GENERAL: alert and no distress  EYES: Right eye- scleral and conjunctival erythema, mild erythema to upper and lower eyelids, PERRL  RESP: breathing unlabored  MS: no gross musculoskeletal defects noted, no edema  SKIN: no suspicious lesions or rashes  NEURO: Normal strength and tone, mentation intact and speech normal  PSYCH: mentation appears normal, affect normal/bright            Signed Electronically by: Isabella Mcdonnell CNP    "

## 2024-10-23 ENCOUNTER — MYC MEDICAL ADVICE (OUTPATIENT)
Dept: FAMILY MEDICINE | Facility: CLINIC | Age: 24
End: 2024-10-23
Payer: COMMERCIAL

## 2024-10-24 ENCOUNTER — NURSE TRIAGE (OUTPATIENT)
Dept: NURSING | Facility: CLINIC | Age: 24
End: 2024-10-24

## 2024-10-24 ENCOUNTER — TELEPHONE (OUTPATIENT)
Dept: FAMILY MEDICINE | Facility: CLINIC | Age: 24
End: 2024-10-24

## 2024-10-24 ENCOUNTER — TELEPHONE (OUTPATIENT)
Dept: OPHTHALMOLOGY | Facility: CLINIC | Age: 24
End: 2024-10-24

## 2024-10-24 ENCOUNTER — OFFICE VISIT (OUTPATIENT)
Dept: FAMILY MEDICINE | Facility: CLINIC | Age: 24
End: 2024-10-24
Payer: COMMERCIAL

## 2024-10-24 VITALS
RESPIRATION RATE: 16 BRPM | SYSTOLIC BLOOD PRESSURE: 132 MMHG | DIASTOLIC BLOOD PRESSURE: 83 MMHG | OXYGEN SATURATION: 96 % | BODY MASS INDEX: 26.49 KG/M2 | HEART RATE: 73 BPM | TEMPERATURE: 97 F | WEIGHT: 159 LBS | HEIGHT: 65 IN

## 2024-10-24 DIAGNOSIS — L03.213 PRESEPTAL CELLULITIS OF RIGHT EYE: Primary | ICD-10-CM

## 2024-10-24 PROCEDURE — 99213 OFFICE O/P EST LOW 20 MIN: CPT | Mod: 25 | Performed by: FAMILY MEDICINE

## 2024-10-24 PROCEDURE — 96372 THER/PROPH/DIAG INJ SC/IM: CPT | Performed by: FAMILY MEDICINE

## 2024-10-24 RX ORDER — OFLOXACIN 3 MG/ML
1-2 SOLUTION/ DROPS OPHTHALMIC EVERY 4 HOURS
Qty: 10 ML | Refills: 0 | Status: SHIPPED | OUTPATIENT
Start: 2024-10-24 | End: 2024-11-04

## 2024-10-24 RX ORDER — CEFTRIAXONE SODIUM 1 G
1 VIAL (EA) INJECTION ONCE
Status: COMPLETED | OUTPATIENT
Start: 2024-10-24 | End: 2024-10-24

## 2024-10-24 RX ORDER — SULFAMETHOXAZOLE AND TRIMETHOPRIM 800; 160 MG/1; MG/1
1 TABLET ORAL 2 TIMES DAILY
Qty: 10 TABLET | Refills: 0 | Status: SHIPPED | OUTPATIENT
Start: 2024-10-24 | End: 2024-10-29

## 2024-10-24 RX ADMIN — Medication 1 G: at 10:43

## 2024-10-24 ASSESSMENT — ENCOUNTER SYMPTOMS: EYE PAIN: 1

## 2024-10-24 ASSESSMENT — PAIN SCALES - GENERAL: PAINLEVEL_OUTOF10: MODERATE PAIN (4)

## 2024-10-24 NOTE — TELEPHONE ENCOUNTER
Reason for Call:  Appointment Request    Patient requesting this type of appt: Chronic Diease Management/Medication/Follow-Up    Requested provider: Bonny Martin    Reason patient unable to be scheduled: Not with their preferred provider    When does patient want to be seen/preferred time: Same day    Comments: Pt has an appt scheduled for 10/24/2024 at 10:00 am but would prefer to see PCP if possible.    Could we send this information to you in [employer] or would you prefer to receive a phone call?:   Patient would prefer a phone call   Okay to leave a detailed message?: Yes at Cell number on file:    Telephone Information:   Mobile [phone]       Call taken on 10/24/2024 at 6:23 AM by LEEANN Contreras   Eprescribed, pt has appointment scheduled.

## 2024-10-24 NOTE — NURSING NOTE
Clinic Administered Medication Documentation        Patient was given ceftriaxone 1g. Prior to medication administration, verified patient's identity using patient s name and date of birth. Please see MAR and medication order for additional information. Patient instructed to remain in clinic for 15 minutes and report any adverse reaction to staff immediately.    Vial/Syringe: Single dose vial. Was entire vial of medication used? Yes    Preethi Jean, CMA

## 2024-10-24 NOTE — PROGRESS NOTES
"  Assessment & Plan     Preseptal cellulitis of right eye  Estuardo Trevino is a 24 year old male who presents today for evaluation of worsening erythema and clear discharge of the right eye.  Erythema and purulent drainage of the right eye started Monday, 10/21, and the patient was seen in the clinic on 10/20/2020 2024 and was diagnosed with bacterial enteritis.  He was treated with Polytrim, however he reports symptoms get worse.  He denies any blurred vision, painful eye movement, double vision or polyphagia.  Symptoms are concerning for preseptal cellulitis.  No current concern for orbital cellulitis due to lack of ophthalmoplegia, double vision or painful eye movement.  Discussed hand hygiene practices, cleaning techniques and use of cool compress versus warm, possible eye patch if having trouble touching his eye. Discussed need for stronger antibiotics and he is to discontinue the Polytrim. He was advised to seek medical attention if he develops new or worsening symptoms, or if he develops pain with extraocular movement or diplopia.   - cefTRIAXone (ROCEPHIN) in lidocaine 1% (PF) for IM administration 1 g  - amoxicillin-clavulanate (AUGMENTIN) 875-125 MG tablet; Take 1 tablet by mouth 2 times daily for 7 days.  - sulfamethoxazole-trimethoprim (BACTRIM DS) 800-160 MG tablet; Take 1 tablet by mouth 2 times daily for 5 days.  - ofloxacin (OCUFLOX) 0.3 % ophthalmic solution; Place 1-2 drops into the right eye every 4 hours.  - Adult Eye  Referral; Future if new or worsening symptoms      BMI  Estimated body mass index is 26.87 kg/m  as calculated from the following:    Height as of this encounter: 1.638 m (5' 4.5\").    Weight as of this encounter: 72.1 kg (159 lb).   Weight management plan: Discussed healthy diet and exercise guidelines      MEDICATIONS:   Orders Placed This Encounter   Medications    cefTRIAXone (ROCEPHIN) in lidocaine 1% (PF) for IM administration 1 g    amoxicillin-clavulanate " "(AUGMENTIN) 875-125 MG tablet     Sig: Take 1 tablet by mouth 2 times daily for 7 days.     Dispense:  14 tablet     Refill:  0    sulfamethoxazole-trimethoprim (BACTRIM DS) 800-160 MG tablet     Sig: Take 1 tablet by mouth 2 times daily for 5 days.     Dispense:  10 tablet     Refill:  0    ofloxacin (OCUFLOX) 0.3 % ophthalmic solution     Sig: Place 1-2 drops into the right eye every 4 hours.     Dispense:  10 mL     Refill:  0           Geraldine Marte is a 24 year old, presenting for the following health issues:  Eye Problem        10/24/2024     9:47 AM   Additional Questions   Roomed by Ciara   Accompanied by mom     History of Present Illness       Reason for visit:  Pink eye  Symptom onset:  1-3 days ago   He is taking medications regularly.  Erythema and purulent drainage started Monday, 10/21, patient obtained OTC artificial tears and was using but symptoms worsened. Diagnosed 10/22/24 with bacterial conjunctivitis and was prescribed polytrim 2 gtts in right eye Q 4 hours. Patient has had worsening erythema, purulent drainage, and now has skin breakdown from warm compresses and repeated washing of drainage. 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 Reports recent allergy symptoms of nasal congestion and sore throat intermittently past week, denies fevers/chest pain/SOB, endorses intermittent headaches and pressure behind right eye when bending over with head below waist. This pressure and headaches are not sustained, taking ibuprofen 200mg intermittently. Denies pain with extraocular movement, denies general pain, endorses \"grittiness\" feeling in eye. Patient reports he has been touching his eye frequently and not doing good hand hygiene before/after contact, Left eye has mild erythema and reportedly discharge this morning.     VISION   Wears glasses: NOT worn for testing  Tool used: Dami   Right eye:        10/12.5 " "(20/25)  Left eye:          10/16 (20/32)   Visual Acuity: Pass      Review of Systems  Constitutional, HEENT, cardiovascular, pulmonary, gi and gu systems are negative, except as otherwise noted.      Objective    /83   Pulse 73   Temp 97  F (36.1  C) (Tympanic)   Resp 16   Ht 1.638 m (5' 4.5\")   Wt 72.1 kg (159 lb)   SpO2 96%   BMI 26.87 kg/m    Body mass index is 26.87 kg/m .  Physical Exam  Vitals and nursing note reviewed.   Constitutional:       General: He is not in acute distress.     Appearance: Normal appearance. He is not ill-appearing, toxic-appearing or diaphoretic.   Eyes:      General:         Right eye: Discharge present.      Extraocular Movements:      Right eye: Normal extraocular motion.      Left eye: Normal extraocular motion.      Conjunctiva/sclera:      Right eye: Exudate present. No chemosis or hemorrhage.     Left eye: No chemosis, exudate or hemorrhage.  Neurological:      Mental Status: He is alert.                    Roma Jane RN, BSN, DNP Student      The student acted as a scribe and the encounter documented above was completely performed by myself and the documentation reflects the work I have performed today.      Signed Electronically by: Jacy Orosco MD    "

## 2024-10-24 NOTE — TELEPHONE ENCOUNTER
[facility] Call Center    Phone Message    May a detailed message be left on voicemail: yes     Reason for Call: Other: Patient called stating he was seen in family practice for bacterial enteritis/preseptal cellulitis and was told if symptoms are not improving within two days to get in right away. Since we are not open Saturdays he was unsure what he should do. Spoke to Joanne at   and she advised if not improving by tomorrow to give us a call and we could try to work him in at one of the clinics. Patient understood and will call us if symptoms aren't improving. FYI.      Action Taken: Other: eye    Travel Screening: Not Applicable

## 2024-10-24 NOTE — TELEPHONE ENCOUNTER
Patient is in clinic now seeing provider. Unable to send this for approval as it is too late. I was not in until 930 today.     Bebe QUEZADA,    [facility]

## 2024-10-24 NOTE — TELEPHONE ENCOUNTER
"Nurse Triage SBAR    Is this a 2nd Level Triage? NO    Situation: Pink eye that has worsened even on polytrim eye drops with new skin symptoms.      Background: consent to communicate on file - home with patient  Seen with Isabella Mcdonnell 10/22/24- started on Poly-trim eye drops    Assessment:   Starting to feel pressure in his eye- very uncomfortable and painful  Skin under his eye is very raw  - skin is brown  - lots of tearing and moisture  Mom describes the skin as a \"shade\" and looking \"brown\"   Lots of pus and drainage  No reports of vision changes   Whites of eyes are red   No fever    Protocol Recommended Disposition:   See HCP Within 4 Hours (Or PCP Triage)    Recommendation: Advised to be seen in the clinic today- reviewed additional care advice with mom and she verbalizes understanding. Already has an appointment today at 10 am. Advised to keep that appointment for follow up on these new symptoms reported. Mother is in agreement with plan. Declines additional questions.      Clinic appointment    Does the patient meet one of the following criteria for ADS visit consideration? 16+ years old, with an [facility] PCP     Salud Paniagua RN BSN 10/24/2024 6:43 AM    Reason for Disposition   MODERATE eye pain (e.g., interferes with normal activities)    Additional Information   Negative: Shock suspected (e.g., cold/pale/clammy skin, too weak to stand, low BP, rapid pulse)   Negative: Difficult to awaken or acting confused (e.g., disoriented, slurred speech)   Negative: Sounds like a life-threatening emergency to the triager   Negative: Eye exposure to chemical or fumes   Negative: Redness of white of eye (sclera), but no pus or only a small amount of brief pus   Negative: SEVERE eye pain (e.g., excruciating)   Negative: [1] Eyelids are very swollen (shut or almost) AND [2] fever   Negative: [1] Eyelid (outer) is very red (or tender to touch) AND [2] fever   Negative: Patient sounds very sick or weak to the " triager    Protocols used: Recent Medical Visit for Illness Follow-up Call-A-, Eye - Pus or Bjgtbrzzy-V-DU

## 2024-10-25 ENCOUNTER — OFFICE VISIT (OUTPATIENT)
Dept: OPHTHALMOLOGY | Facility: CLINIC | Age: 24
End: 2024-10-25
Payer: COMMERCIAL

## 2024-10-25 DIAGNOSIS — L03.213 PRESEPTAL CELLULITIS OF RIGHT EYE: Primary | ICD-10-CM

## 2024-10-25 PROCEDURE — 92002 INTRM OPH EXAM NEW PATIENT: CPT | Performed by: OPTOMETRIST

## 2024-10-25 ASSESSMENT — VISUAL ACUITY
OD_CC+: -1
OD_CC: 20/25
OS_CC: 20/30
METHOD: SNELLEN - LINEAR
CORRECTION_TYPE: GLASSES

## 2024-10-25 ASSESSMENT — CONF VISUAL FIELD
OS_INFERIOR_NASAL_RESTRICTION: 0
OS_SUPERIOR_NASAL_RESTRICTION: 0
OS_NORMAL: 1
OD_NORMAL: 1
OD_INFERIOR_TEMPORAL_RESTRICTION: 0
METHOD: COUNTING FINGERS
OS_INFERIOR_TEMPORAL_RESTRICTION: 0
OD_SUPERIOR_TEMPORAL_RESTRICTION: 0
OD_SUPERIOR_NASAL_RESTRICTION: 0
OD_INFERIOR_NASAL_RESTRICTION: 0
OS_SUPERIOR_TEMPORAL_RESTRICTION: 0

## 2024-10-25 ASSESSMENT — TONOMETRY
IOP_METHOD: ICARE
OD_IOP_MMHG: 14
OS_IOP_MMHG: 20

## 2024-10-25 ASSESSMENT — EXTERNAL EXAM - LEFT EYE: OS_EXAM: NORMAL

## 2024-10-25 ASSESSMENT — SLIT LAMP EXAM - LIDS: COMMENTS: NORMAL

## 2024-10-25 ASSESSMENT — EXTERNAL EXAM - RIGHT EYE: OD_EXAM: NORMAL

## 2024-10-25 NOTE — TELEPHONE ENCOUNTER
Pt was unavailable. LVM that I scheduled him for an appt today at 12:40 in . Told him to call back if this time does not work for him.  Gladys Chaves COA 9:06 AM October 25, 2024

## 2024-10-25 NOTE — NURSING NOTE
Chief Complaints and History of Present Illnesses   Patient presents with    Cellulitis, Orbital Evaluation     Chief Complaint(s) and History of Present Illness(es)       Cellulitis, Orbital Evaluation              Laterality: right eye              Comments    Pt comes to the clinic for evaluation for conjunctivitis/ and possible orbital cellulitis. He states that the symptoms started on 10/22. At the onset, he had redness and discharge. He was seen the next day and prescribed polytrim drops. He had NI with these and he discharge. He was seen again on 10/23 where he was diagnosed with cellulitis. He was given ofloxacin, 2 oral antibiotics, and a intramuscular kenalog injection. He was told if it is not better by Saturday, he should be seen by an eye doctor. He has continued redness, tearing and lid inflammation- but some improvement on the current therapies. Would like reassurance b-4 the weekend.

## 2024-10-25 NOTE — TELEPHONE ENCOUNTER
[facility] Call Center    Phone Message    May a detailed message be left on voicemail: yes     Reason for Call: Appointment Intake    Referring Provider Name: Jacy Orosco MD   Diagnosis and/or Symptoms: Preseptal cellulitis of right eye [L03.213]     Pt was advised to call in today for an appt if symptoms improved, pt states no increase of symptoms but will still like to be scheduled.    DX not in protocol, writer unable to schedule.    Please reach out to pt with scheduling options.     Thank You!    Action Taken: Message routed to:  [facility]    Travel Screening: Not Applicable     Date of Service:

## 2024-10-25 NOTE — PROGRESS NOTES
Assessment/Plan  (L03.213) Preseptal cellulitis of right eye  (primary encounter diagnosis)  Comment: Treatment with ceftriaxone IM injection. Patient is also taking Augmentin and Bactrim.   Plan: Ok to continue with oral medications. Add hot compresses (rice in sock microwaved) as often as possible over next few days. Return to [facility] if condition worsens or is accompanied by increase in pain, swelling, or vision changes.       Complete documentation of historical and exam elements from today's encounter can  be found in the full encounter summary report (not reduplicated in this progress  note). I personally obtained the chief complaint(s) and history of present illness. I  confirmed and edited as necessary the review of systems, past medical/surgical  history, family history, social history, and examination findings as documented by  others; and I examined the patient myself. I personally reviewed the relevant tests,  images, and reports as documented above. I formulated and edited as necessary the  assessment and plan and discussed the findings and management plan with the  patient and family.    Juancarlos Ashraf, OD

## 2024-10-25 NOTE — TELEPHONE ENCOUNTER
Patient is being admitted to floor from ER    Per notes:    Sundra Aleksing  75/[birth year]    DAUGHTER -  [address] [facility]    Phone Message    May a detailed message be left on voicemail: yes     Reason for Call: Other: Pt is returning a call from Gladys. States today's appt works for him and he'll be at the clinic this afternoon!      Action Taken: Message routed to:  [facility]    Travel Screening: Not Applicable     Date of Service:

## 2024-11-04 ENCOUNTER — NURSE TRIAGE (OUTPATIENT)
Dept: FAMILY MEDICINE | Facility: CLINIC | Age: 24
End: 2024-11-04

## 2024-11-04 ENCOUNTER — OFFICE VISIT (OUTPATIENT)
Dept: FAMILY MEDICINE | Facility: CLINIC | Age: 24
End: 2024-11-04
Payer: COMMERCIAL

## 2024-11-04 VITALS
SYSTOLIC BLOOD PRESSURE: 138 MMHG | RESPIRATION RATE: 16 BRPM | DIASTOLIC BLOOD PRESSURE: 87 MMHG | WEIGHT: 160 LBS | HEIGHT: 64 IN | TEMPERATURE: 99.2 F | OXYGEN SATURATION: 97 % | HEART RATE: 75 BPM | BODY MASS INDEX: 27.31 KG/M2

## 2024-11-04 DIAGNOSIS — R09.81 CONGESTION OF PARANASAL SINUS: Primary | ICD-10-CM

## 2024-11-04 PROCEDURE — 99213 OFFICE O/P EST LOW 20 MIN: CPT | Performed by: FAMILY MEDICINE

## 2024-11-04 ASSESSMENT — PAIN SCALES - GENERAL: PAINLEVEL_OUTOF10: NO PAIN (0)
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
## 2024-11-04 NOTE — TELEPHONE ENCOUNTER
Called patient to triage new or worsening symptoms per [employer] message sent today 11/4/2024. Patient has had office visits for preseptal cellulitis of right eye and was given a few eye drops, and ceftriaxone 1g on 10/24/24.    Called patient to triage new or worsening symptoms. Patient should have a follow up visit. ADS may be a good candidate for patient to be seen.     Isabella Hsieh RN     [facility]   [phone]  RENAL FOLLOW UP NOTE  --------------------------------------------------------------------------------  Chief Complaint:    24 hour events/subjective:    seen earlier   on CRRT   on  and vasopressin/levophed/epi drips       PAST HISTORY  --------------------------------------------------------------------------------  No significant changes to PMH, PSH, FHx, SHx, unless otherwise noted    ALLERGIES & MEDICATIONS  --------------------------------------------------------------------------------  Allergies    No Known Allergies    Intolerances      Standing Inpatient Medications  allopurinol 100 milliGRAM(s) Oral daily  aMIOdarone    Tablet 200 milliGRAM(s) Oral daily  ascorbic acid 500 milliGRAM(s) Oral daily  atorvastatin 40 milliGRAM(s) Oral at bedtime  buMETAnide Infusion 2 mG/Hr IV Continuous <Continuous>  chlorhexidine 2% Cloths 1 Application(s) Topical <User Schedule>  cyanocobalamin 1000 MICROGram(s) Oral daily  dexMEDEtomidine Infusion 0.5 MICROgram(s)/kG/Hr IV Continuous <Continuous>  DOBUTamine Infusion 7.5 MICROgram(s)/kG/Min IV Continuous <Continuous>  enoxaparin Injectable 30 milliGRAM(s) SubCutaneous every 12 hours  EPINEPHrine    Infusion 0.06 MICROgram(s)/kG/Min IV Continuous <Continuous>  epoetin zara-epbx (RETACRIT) Injectable 69279 Unit(s) IV Push <User Schedule>  fluconAZOLE IVPB      fluconAZOLE IVPB 200 milliGRAM(s) IV Intermittent every 24 hours  folic acid 1 milliGRAM(s) Oral daily  levothyroxine 75 MICROGram(s) Oral daily  lidocaine   4% Patch 1 Patch Transdermal daily  melatonin 5 milliGRAM(s) Oral at bedtime  meropenem  IVPB      meropenem  IVPB 1000 milliGRAM(s) IV Intermittent every 8 hours  metoclopramide Injectable 5 milliGRAM(s) IV Push every 8 hours  midodrine 20 milliGRAM(s) Oral every 8 hours  norepinephrine Infusion 0.06 MICROgram(s)/kG/Min IV Continuous <Continuous>  pantoprazole    Tablet 40 milliGRAM(s) Oral before breakfast  polyethylene glycol 3350 17 Gram(s) Oral daily  senna 2 Tablet(s) Oral at bedtime  sertraline 50 milliGRAM(s) Oral daily  sodium chloride 0.9% lock flush 3 milliLiter(s) IV Push every 8 hours  thiamine 100 milliGRAM(s) Oral daily  vancomycin  IVPB 750 milliGRAM(s) IV Intermittent every 12 hours  vasopressin Infusion 0.04 Unit(s)/Min IV Continuous <Continuous>    PRN Inpatient Medications      REVIEW OF SYSTEMS  --------------------------------------------------------------------------------    Gen: denies  fevers/chills,  CVS: denies chest pain/palpitations  Resp: denies SOB/Cough  GI: Denies N/V/Abd pain  : Denies dysuria/oliguria/hematuria    All other systems were reviewed and are negative, except as noted.    VITALS/PHYSICAL EXAM  --------------------------------------------------------------------------------  T(C): 37.2 (24 @ 20:00), Max: 37.3 (24 @ 00:00)  HR: 80 (24 @ 19:45) (68 - 94)  BP: --  RR: 17 (24 @ 19:45) (15 - 34)  SpO2: 99% (24 @ 19:45) (90% - 100%)  Wt(kg): --        24 @ 07:01  -  24 @ 07:00  --------------------------------------------------------  IN: 2416.6 mL / OUT: 3441 mL / NET: -1024.4 mL    24 @ 07:01  -  24 @ 20:14  --------------------------------------------------------  IN: 2061.3 mL / OUT: 3699 mL / NET: -1637.7 mL      Physical Exam:  	    Gen:  on high flow O2 + IJ cath   	+ Hall   	Pulm: decrease bs + coarse bs   	CV: RRR, S1S2; no rub  	Abd: +BS, soft, nontender/nondistended  	: No suprapubic tenderness  	UE: Warm, no cyanosis  no clubbing,  no edema  	LE: Warm, no cyanosis  no clubbing,  no   edema  	Neuro: alert and oriented. speech coherent       LABS/STUDIES  --------------------------------------------------------------------------------              9.2    15.18 >-----------<  143      [24 @ 13:49]              27.6     131  |  97  |  41  ----------------------------<  143      [24 @ 00:29]  4.2   |  21  |  1.54        Ca     9.7     [24 00:29]      Mg     2.6     [24 00:29]      Phos  2.3     [24 11:30]    TPro  6.8  /  Alb  4.2  /  TBili  4.6  /  DBili  x   /  AST  47  /  ALT  22  /  AlkPhos  153  [24 00:29]    PT/INR: PT 14.1 , INR 1.35       [24 @ 06:17]  PTT: 32.0       [24 06:17]      Creatinine Trend:  SCr 1.54 [ 00:29]  SCr 1.41 [ 00:30]  SCr 1.28 [ 00:42]  SCr 1.33 [ 13:15]  SCr 1.52 [ 00:32]      Iron 290, TIBC 430, %sat 67      [24 @ 13:57]  Ferritin 335      [24 @ 13:57]  PTH -- (Ca 8.9)      [24 @ 13:03]   242  TSH 2.02      [24 @ 13:57]

## 2024-11-04 NOTE — PROGRESS NOTES
"  Assessment & Plan     Congestion of paranasal sinus  Will monitor for now as was just on good course of abx  He otherwise feels well  If starts to worsen would do longer course of augmentin.                   Geraldine Marte is a 24 year old, presenting for the following health issues:  Nasal Congestion (Eye is slight pink ( recently treated for pink eye)/)      11/4/2024     2:24 PM   Additional Questions   Roomed by sanford     History of Present Illness       Reason for visit:  Pink eye  Symptom onset:  1-3 days ago   He is taking medications regularly.     Pt with recent periseptal cellulitis  Treated with augmentin, bactrin abx eye drops and shot of rocephin  Finished abx about a week ago and eye symptoms have improved other than some mild dry/sticky feeling., he is using otc eye drops    Pt now with right sinus congestion. No cough or discharge, just congestion on the right  No fever and otherwise feels fine      Review of Systems  Constitutional, HEENT, cardiovascular, pulmonary, gi and gu systems are negative, except as otherwise noted.      Objective    /87   Pulse 75   Temp 99.2  F (37.3  C) (Oral)   Resp 16   Ht 1.626 m (5' 4\")   Wt 72.6 kg (160 lb)   SpO2 97%   BMI 27.46 kg/m    Body mass index is 27.46 kg/m .  Physical Exam   GENERAL: alert and no distress  HENT: ear canals and TM's normal, nose and mouth without ulcers or lesions  NECK: no adenopathy, no asymmetry, masses, or scars  RESP: lungs clear to auscultation - no rales, rhonchi or wheezes  CV: regular rate and rhythm, normal S1 S2, no S3 or S4, no murmur, click or rub, no peripheral edema     No results found for this or any previous visit (from the past 24 hours).        Signed Electronically by: Bonny Martin MD    "

## 2024-11-06 ENCOUNTER — MYC MEDICAL ADVICE (OUTPATIENT)
Dept: FAMILY MEDICINE | Facility: CLINIC | Age: 24
End: 2024-11-06
Payer: COMMERCIAL

## 2024-12-10 ENCOUNTER — MYC MEDICAL ADVICE (OUTPATIENT)
Dept: FAMILY MEDICINE | Facility: CLINIC | Age: 24
End: 2024-12-10
Payer: COMMERCIAL

## 2024-12-11 NOTE — TELEPHONE ENCOUNTER
See [employer] messages and updates below.  Patient was seen in office on 11/4/24. See previous [employer] messages in Chart Review from 11/6/24.  Please advise on plan.

## 2025-01-22 ENCOUNTER — OFFICE VISIT (OUTPATIENT)
Dept: OPHTHALMOLOGY | Facility: CLINIC | Age: 25
End: 2025-01-22
Payer: COMMERCIAL

## 2025-01-22 DIAGNOSIS — R09.81 NASAL CONGESTION: ICD-10-CM

## 2025-01-22 DIAGNOSIS — H10.403 CHRONIC CONJUNCTIVITIS OF BOTH EYES, UNSPECIFIED CHRONIC CONJUNCTIVITIS TYPE: Primary | ICD-10-CM

## 2025-01-22 PROCEDURE — 92012 INTRM OPH EXAM EST PATIENT: CPT | Performed by: OPTOMETRIST

## 2025-01-22 RX ORDER — ERYTHROMYCIN 5 MG/G
0.5 OINTMENT OPHTHALMIC 3 TIMES DAILY
Qty: 3.5 G | Refills: 1 | Status: SHIPPED | OUTPATIENT
Start: 2025-01-22

## 2025-01-22 ASSESSMENT — CONF VISUAL FIELD
OS_SUPERIOR_TEMPORAL_RESTRICTION: 0
OD_INFERIOR_NASAL_RESTRICTION: 0
OS_INFERIOR_TEMPORAL_RESTRICTION: 0
METHOD: COUNTING FINGERS
OD_SUPERIOR_NASAL_RESTRICTION: 0
OD_INFERIOR_TEMPORAL_RESTRICTION: 0
OD_NORMAL: 1
OS_NORMAL: 1
OS_INFERIOR_NASAL_RESTRICTION: 0
OD_SUPERIOR_TEMPORAL_RESTRICTION: 0
OS_SUPERIOR_NASAL_RESTRICTION: 0

## 2025-01-22 ASSESSMENT — REFRACTION_WEARINGRX
OD_SPHERE: -1.25
OS_CYLINDER: +3.25
OD_AXIS: 001
OD_CYLINDER: +3.00
OS_SPHERE: -2.00
OS_AXIS: 159

## 2025-01-22 ASSESSMENT — TONOMETRY
OD_IOP_MMHG: 19
OS_IOP_MMHG: 17
IOP_METHOD: ICARE

## 2025-01-22 ASSESSMENT — SLIT LAMP EXAM - LIDS
COMMENTS: 1+ MGD
COMMENTS: 1+ MGD

## 2025-01-22 ASSESSMENT — VISUAL ACUITY
OD_PH_CC+: -2
METHOD: SNELLEN - LINEAR
OD_CC: 20/25
OS_CC: 20/30
CORRECTION_TYPE: GLASSES
OS_PH_CC: 20/25
OD_PH_CC: 20/20

## 2025-01-22 ASSESSMENT — EXTERNAL EXAM - LEFT EYE: OS_EXAM: NORMAL

## 2025-01-22 ASSESSMENT — EXTERNAL EXAM - RIGHT EYE: OD_EXAM: NORMAL

## 2025-01-22 NOTE — NURSING NOTE
Chief Complaints and History of Present Illnesses   Patient presents with    Follow Up     Presseptal cellulitis right eye      Chief Complaint(s) and History of Present Illness(es)       Follow Up              Laterality: right eye    Onset: 3 months ago    Comments: Presseptal cellulitis right eye               Comments    Symptoms have almost all resolved.  When he wakes up in the morning will have goopiness in the corner of the eye and vision still blurry.  It will dry eye out and crust, whitish color.  Lashes feel sticky throughout the day.  Feels developed stuffy nose all the time and thinks it is from the eye drops or continued infection.  Denies pain.  Notes excessive blinking.     Florence Rod on 1/22/2025 at 12:12 PM

## 2025-01-22 NOTE — PROGRESS NOTES
Assessment/Plan  (H10.403) Chronic conjunctivitis of both eyes, unspecified chronic conjunctivitis type  (primary encounter diagnosis)  Plan: erythromycin (ROMYCIN) 5 MG/GM ophthalmic         ointment        Reassured patient that any infection present today is minimal. Recommend trying to use erythromycin ointment three times daily in affected eye(s) for about 1 week. Call or return to clinic if symptoms persist.     (R09.81) Nasal congestion  Comment: Persistent since November 2024  Plan: Recommend trying Flonase daily for 1 week to see if symptoms improve. Offered ENT consult as well, which patient agrees to.     Complete documentation of historical and exam elements from today's encounter can  be found in the full encounter summary report (not reduplicated in this progress  note). I personally obtained the chief complaint(s) and history of present illness. I  confirmed and edited as necessary the review of systems, past medical/surgical  history, family history, social history, and examination findings as documented by  others; and I examined the patient myself. I personally reviewed the relevant tests,  images, and reports as documented above. I formulated and edited as necessary the  assessment and plan and discussed the findings and management plan with the  patient and family.    Juancarlos Ashraf OD

## 2025-01-26 NOTE — PROGRESS NOTES
Chief Complaint   Patient presents with    Consult     Nasal congestion x months - using flonase and not helping  Was on eye drops for months, thinks this might contributed to congestion?     History of Present Illness   Estuardo Trevino is a 24 year old male who presents today for evaluation. I am seeing this patient in consultation for nasal congestion at the request of the provider Dr. Juancarlos Ashraf. The patient describes symptoms of alternating and nasal congestion nasal obstruction for the past 4-6 months.  The patient noted that symptoms started after treatment for bacterial conjunctivitis.  He was on multiple antibiotic eyedrops, ointments, it sounds like oral antibiotics.  Since that time, he has noticed nasal congestion, alternating nasal obstruction, and sinus pressure.  Symptoms are worse when he is lying down.  He is tried some Flonase with minimal benefit.  He reports a seasonal allergy history but is not had formal allergy testing.  He does have a history of mild asthma but does not take a daily inhaler.  He denies any rhinorrhea, postnasal drainage, taste or smell changes.  He has not had previous nose or sinus surgery.  The patient is a non-smoker.  He does not have any recent nose or sinus imaging.      Past Medical History  Patient Active Problem List   Diagnosis    Epistaxis    Seasonal allergies    Mild intermittent asthma without complication    Right knee pain, unspecified chronicity     Current Medications     Current Outpatient Medications:     albuterol (PROAIR HFA/PROVENTIL HFA/VENTOLIN HFA) 108 (90 Base) MCG/ACT inhaler, INHALE 2 PUFFS INTO THE LUNGS EVERY 4 HOURS AS NEEDED FOR SHORTNESS OF BREATH OR DIFFICULT BREATHING, Disp: 8.5 g, Rfl: 3    erythromycin (ROMYCIN) 5 MG/GM ophthalmic ointment, Place 0.5 inches into both eyes 3 times daily., Disp: 3.5 g, Rfl: 1    fluticasone (FLOVENT DISKUS) 100 MCG/ACT inhaler, Inhale 1 puff into the lungs every 12 hours, Disp: 60 each, Rfl: 3     "mometasone furoate (ASMANEX HFA) 100 MCG/ACT inhaler, Inhale 2 puffs into the lungs 2 times daily, Disp: 13 g, Rfl: 3    Current Facility-Administered Medications:     lidocaine (PF) 0.5 % injection SOLN 8 mL, 8 mL, , , Ady Espinoza MD, 8 mL at 10/14/19 0927    triamcinolone (KENALOG-40) injection 40 mg, 40 mg, , , Ady Espinoza MD, 40 mg at 10/14/19 0927    Allergies  Allergies   Allergen Reactions    Nkda [No Known Drug Allergy]        Social History   Social History     Socioeconomic History    Marital status: Single   Tobacco Use    Smoking status: Never    Smokeless tobacco: Never   Vaping Use    Vaping status: Never Used   Substance and Sexual Activity    Alcohol use: No    Drug use: No    Sexual activity: Never     Social Drivers of Health     Interpersonal Safety: Low Risk  (11/4/2024)    Interpersonal Safety     Do you feel physically and emotionally safe where you currently live?: Yes     Within the past 12 months, have you been hit, slapped, kicked or otherwise physically hurt by someone?: No     Within the past 12 months, have you been humiliated or emotionally abused in other ways by your partner or ex-partner?: No       Family History  Family History   Problem Relation Age of Onset    Eye Disorder Mother     Allergies Father     Diabetes Maternal Grandmother         type 2    Depression Maternal Grandmother     Hypertension Maternal Grandfather     Lipids Maternal Grandfather     C.A.D. Maternal Grandfather     Asthma Brother     Glaucoma No family hx of     Macular Degeneration No family hx of        Review of Systems  As per HPI and PMHx, otherwise 10+ comprehensive system review is negative.    Physical Exam  /81   Pulse 77   Resp 16   Ht 1.626 m (5' 4\")   Wt 73.3 kg (161 lb 9.6 oz)   SpO2 96%   BMI 27.74 kg/m    GENERAL: The patient is a pleasant, cooperative 24 year old male in no acute distress.  HEAD: Normocephalic, atraumatic. Hair and scalp are " normal.  EYES: Pupils are equal, round, reactive to light and accommodation. Extraocular movements are intact. The sclera nonicteric without injection. The extraocular structures are normal.  EARS: Normal shape and symmetry. No tenderness when palpating the mastoid or tragal areas bilaterally. No mastoid erythema or fluctuance. Otoscopic exam on the right reveals a minimal amount of cerumen. The right tympanic membrane is round, intact without evidence of effusion, good landmarks.  No retraction, granulation, or drainage. Otoscopic exam on the left reveals a minimal amount of cerumen. The left tympanic membrane is round, intact without evidence of effusion, good landmarks.  No retraction, granulation, or drainage.  NOSE: Nares are patent.  Nasal mucosa is pink and moist.  Nasal septum is midline anteriorly.  The inferior turbinates appear normal.  No nasal cavity masses, polyps, or mucopurulence on anterior rhinoscopy.  NEUROLOGIC: Cranial nerves II through XII are grossly intact. Voice is strong. Patient is House-Brackmann I/VI bilaterally.  CARDIOVASCULAR: Extremities are warm and well-perfused. No significant peripheral edema.  RESPIRATORY: Patient has nonlabored breathing without cough, wheeze, stridor.  PSYCHIATRIC: Patient is alert and oriented. Mood and affect appear normal.  SKIN: Warm and dry. No scalp, face, or neck lesions noted.    Procedure: Flexible Nasal Endoscopy  Indication: Nasal congestion, sinus pressure    To best visualize the sinonasal anatomyand due to the chief complaint and HPI, I proceeded with flexible fiberoptic nasal endoscopy. The bilateral nasal cavities were anesthetized and decongested. The bilateral nasal cavities were then examined using flexible fiberoptic nasal endoscope. The right nasal cavity was without masses, polyps, or mucopurulence. The right middle turbinate and middle meatus was normal in appearance. The sphenoethmoid recess, inferior meatus, superior meatus, and  frontal sinus outflow areas were clear. The left nasal cavity was without masses, polyps, or mucopurulence. The left middle turbinate and middle meatus was normal in appearance. The sphenoethmoid recess, inferior meatus, superior meatus, and frontal sinus outflow areas were clear. The sinonasalmucosa appeared normal. The nasal septum was essentially midline. The inferior turbinates were moderately hypertrophied. The nasopharynx had a normal appearance with normal Eustachian tube openings and fossa of Rosenmuller bilaterally.  Minimal adenoid tissue. The scope was removed. The patient tolerated the procedure well.    Assessment and Plan     ICD-10-CM    1. Nasal congestion  R09.81 NASAL ENDOSCOPY, DIAGNOSTIC     CT Sinus w/o Contrast      2. Nasolacrimal duct obstruction, acquired, bilateral  H04.553 NASAL ENDOSCOPY, DIAGNOSTIC     CT Sinus w/o Contrast      3. History of allergic rhinitis  Z87.09 NASAL ENDOSCOPY, DIAGNOSTIC     CT Sinus w/o Contrast        It was my pleasure seeing Estuardo Trevino today in clinic.  The patient presents with nasal jest, alternating nasal obstruction, and nose and sinus pressure several months after developing viral/bacterial conjunctivitis.  He still having eye symptoms but feel like his his endoscopic nasal exam today is within normal limits.  I would recommend a CT scan of the sinuses eye symptoms and no symptoms go together.  To view the sinus drainage pathways and the nasolacrimal duct.  Depending on the results of the CT, we might consider allergy evaluation and allergy testing.  He may also need referral to an ophthalmologist for nasolacrimal duct evaluation.      I will contact the patient with the CT scan results when available.  He would prefer a phone call.    Ruel Bobo MD  Department of Otolaryngology-Head and Neck Surgery  [facility]

## 2025-01-29 ENCOUNTER — OFFICE VISIT (OUTPATIENT)
Dept: OTOLARYNGOLOGY | Facility: CLINIC | Age: 25
End: 2025-01-29
Payer: COMMERCIAL

## 2025-01-29 VITALS
SYSTOLIC BLOOD PRESSURE: 124 MMHG | DIASTOLIC BLOOD PRESSURE: 81 MMHG | HEART RATE: 77 BPM | WEIGHT: 161.6 LBS | BODY MASS INDEX: 27.59 KG/M2 | HEIGHT: 64 IN | RESPIRATION RATE: 16 BRPM | OXYGEN SATURATION: 96 %

## 2025-01-29 DIAGNOSIS — Z87.09 HISTORY OF ALLERGIC RHINITIS: ICD-10-CM

## 2025-01-29 DIAGNOSIS — H04.553 NASOLACRIMAL DUCT OBSTRUCTION, ACQUIRED, BILATERAL: ICD-10-CM

## 2025-01-29 DIAGNOSIS — R09.81 NASAL CONGESTION: Primary | ICD-10-CM

## 2025-01-29 NOTE — LETTER
1/29/2025      Estuardo Trevino  [address] [id number]      Dear Colleague,    Thank you for referring your patient, Estuardo Trevino, to the [facility]. Please see a copy of my visit note below.    Chief Complaint   Patient presents with     Consult     Nasal congestion x months - using flonase and not helping  Was on eye drops for months, thinks this might contributed to congestion?     History of Present Illness   Estuardo Trevino is a 24 year old male who presents today for evaluation. I am seeing this patient in consultation for nasal congestion at the request of the provider Dr. Juancarlos Ashraf. The patient describes symptoms of alternating and nasal congestion nasal obstruction for the past 4-6 months.  The patient noted that symptoms started after treatment for bacterial conjunctivitis.  He was on multiple antibiotic eyedrops, ointments, it sounds like oral antibiotics.  Since that time, he has noticed nasal congestion, alternating nasal obstruction, and sinus pressure.  Symptoms are worse when he is lying down.  He is tried some Flonase with minimal benefit.  He reports a seasonal allergy history but is not had formal allergy testing.  He does have a history of mild asthma but does not take a daily inhaler.  He denies any rhinorrhea, postnasal drainage, taste or smell changes.  He has not had previous nose or sinus surgery.  The patient is a non-smoker.  He does not have any recent nose or sinus imaging.      Past Medical History  Patient Active Problem List   Diagnosis     Epistaxis     Seasonal allergies     Mild intermittent asthma without complication     Right knee pain, unspecified chronicity     Current Medications     Current Outpatient Medications:      albuterol (PROAIR HFA/PROVENTIL HFA/VENTOLIN HFA) 108 (90 Base) MCG/ACT inhaler, INHALE 2 PUFFS INTO THE LUNGS EVERY 4 HOURS AS NEEDED FOR SHORTNESS OF BREATH OR DIFFICULT BREATHING, Disp: 8.5 g, Rfl:  3     erythromycin (ROMYCIN) 5 MG/GM ophthalmic ointment, Place 0.5 inches into both eyes 3 times daily., Disp: 3.5 g, Rfl: 1     fluticasone (FLOVENT DISKUS) 100 MCG/ACT inhaler, Inhale 1 puff into the lungs every 12 hours, Disp: 60 each, Rfl: 3     mometasone furoate (ASMANEX HFA) 100 MCG/ACT inhaler, Inhale 2 puffs into the lungs 2 times daily, Disp: 13 g, Rfl: 3    Current Facility-Administered Medications:      lidocaine (PF) 0.5 % injection SOLN 8 mL, 8 mL, , , Ady Espinoza MD, 8 mL at 10/14/19 0927     triamcinolone (KENALOG-40) injection 40 mg, 40 mg, , , Ady Espinoza MD, 40 mg at 10/14/19 0927    Allergies  Allergies   Allergen Reactions     Nkda [No Known Drug Allergy]        Social History   Social History     Socioeconomic History     Marital status: Single   Tobacco Use     Smoking status: Never     Smokeless tobacco: Never   Vaping Use     Vaping status: Never Used   Substance and Sexual Activity     Alcohol use: No     Drug use: No     Sexual activity: Never     Social Drivers of Health     Interpersonal Safety: Low Risk  (11/4/2024)    Interpersonal Safety      Do you feel physically and emotionally safe where you currently live?: Yes      Within the past 12 months, have you been hit, slapped, kicked or otherwise physically hurt by someone?: No      Within the past 12 months, have you been humiliated or emotionally abused in other ways by your partner or ex-partner?: No       Family History  Family History   Problem Relation Age of Onset     Eye Disorder Mother      Allergies Father      Diabetes Maternal Grandmother         type 2     Depression Maternal Grandmother      Hypertension Maternal Grandfather      Lipids Maternal Grandfather      C.A.D. Maternal Grandfather      Asthma Brother      Glaucoma No family hx of      Macular Degeneration No family hx of        Review of Systems  As per HPI and PMHx, otherwise 10+ comprehensive system review is  "negative.    Physical Exam  /81   Pulse 77   Resp 16   Ht 1.626 m (5' 4\")   Wt 73.3 kg (161 lb 9.6 oz)   SpO2 96%   BMI 27.74 kg/m    GENERAL: The patient is a pleasant, cooperative 24 year old male in no acute distress.  HEAD: Normocephalic, atraumatic. Hair and scalp are normal.  EYES: Pupils are equal, round, reactive to light and accommodation. Extraocular movements are intact. The sclera nonicteric without injection. The extraocular structures are normal.  EARS: Normal shape and symmetry. No tenderness when palpating the mastoid or tragal areas bilaterally. No mastoid erythema or fluctuance. Otoscopic exam on the right reveals a minimal amount of cerumen. The right tympanic membrane is round, intact without evidence of effusion, good landmarks.  No retraction, granulation, or drainage. Otoscopic exam on the left reveals a minimal amount of cerumen. The left tympanic membrane is round, intact without evidence of effusion, good landmarks.  No retraction, granulation, or drainage.  NOSE: Nares are patent.  Nasal mucosa is pink and moist.  Nasal septum is midline anteriorly.  The inferior turbinates appear normal.  No nasal cavity masses, polyps, or mucopurulence on anterior rhinoscopy.  NEUROLOGIC: Cranial nerves II through XII are grossly intact. Voice is strong. Patient is House-Brackmann I/VI bilaterally.  CARDIOVASCULAR: Extremities are warm and well-perfused. No significant peripheral edema.  RESPIRATORY: Patient has nonlabored breathing without cough, wheeze, stridor.  PSYCHIATRIC: Patient is alert and oriented. Mood and affect appear normal.  SKIN: Warm and dry. No scalp, face, or neck lesions noted.    Procedure: Flexible Nasal Endoscopy  Indication: Nasal congestion, sinus pressure    To best visualize the sinonasal anatomyand due to the chief complaint and HPI, I proceeded with flexible fiberoptic nasal endoscopy. The bilateral nasal cavities were anesthetized and decongested. The bilateral " nasal cavities were then examined using flexible fiberoptic nasal endoscope. The right nasal cavity was without masses, polyps, or mucopurulence. The right middle turbinate and middle meatus was normal in appearance. The sphenoethmoid recess, inferior meatus, superior meatus, and frontal sinus outflow areas were clear. The left nasal cavity was without masses, polyps, or mucopurulence. The left middle turbinate and middle meatus was normal in appearance. The sphenoethmoid recess, inferior meatus, superior meatus, and frontal sinus outflow areas were clear. The sinonasalmucosa appeared normal. The nasal septum was essentially midline. The inferior turbinates were moderately hypertrophied. The nasopharynx had a normal appearance with normal Eustachian tube openings and fossa of Rosenmuller bilaterally.  Minimal adenoid tissue. The scope was removed. The patient tolerated the procedure well.    Assessment and Plan     ICD-10-CM    1. Nasal congestion  R09.81 NASAL ENDOSCOPY, DIAGNOSTIC     CT Sinus w/o Contrast      2. Nasolacrimal duct obstruction, acquired, bilateral  H04.553 NASAL ENDOSCOPY, DIAGNOSTIC     CT Sinus w/o Contrast      3. History of allergic rhinitis  Z87.09 NASAL ENDOSCOPY, DIAGNOSTIC     CT Sinus w/o Contrast        It was my pleasure seeing Estuardo Trevino today in clinic.  The patient presents with nasal jest, alternating nasal obstruction, and nose and sinus pressure several months after developing viral/bacterial conjunctivitis.  He still having eye symptoms but feel like his his endoscopic nasal exam today is within normal limits.  I would recommend a CT scan of the sinuses eye symptoms and no symptoms go together.  To view the sinus drainage pathways and the nasolacrimal duct.  Depending on the results of the CT, we might consider allergy evaluation and allergy testing.  He may also need referral to an ophthalmologist for nasolacrimal duct evaluation.      I will contact the patient with the CT  scan results when available.  He would prefer a phone call.    Ruel Bobo MD  Department of Otolaryngology-Head and Neck Surgery  [facility]     Again, thank you for allowing me to participate in the care of your patient.        Sincerely,        Ruel Bobo MD    Electronically signed

## 2025-02-07 ENCOUNTER — ANCILLARY PROCEDURE (OUTPATIENT)
Dept: CT IMAGING | Facility: CLINIC | Age: 25
End: 2025-02-07
Attending: OTOLARYNGOLOGY
Payer: COMMERCIAL

## 2025-02-07 DIAGNOSIS — Z87.09 HISTORY OF ALLERGIC RHINITIS: ICD-10-CM

## 2025-02-07 DIAGNOSIS — R09.81 NASAL CONGESTION: ICD-10-CM

## 2025-02-07 DIAGNOSIS — H04.553 NASOLACRIMAL DUCT OBSTRUCTION, ACQUIRED, BILATERAL: ICD-10-CM

## 2025-02-07 PROCEDURE — 70486 CT MAXILLOFACIAL W/O DYE: CPT | Mod: TC | Performed by: RADIOLOGY

## 2025-02-12 DIAGNOSIS — H10.403 CHRONIC CONJUNCTIVITIS OF BOTH EYES, UNSPECIFIED CHRONIC CONJUNCTIVITIS TYPE: Primary | ICD-10-CM

## 2025-05-05 ENCOUNTER — MYC MEDICAL ADVICE (OUTPATIENT)
Dept: OTOLARYNGOLOGY | Facility: CLINIC | Age: 25
End: 2025-05-05
Payer: COMMERCIAL

## 2025-05-05 DIAGNOSIS — Z87.09 HISTORY OF ALLERGIC RHINITIS: ICD-10-CM

## 2025-05-05 DIAGNOSIS — R09.81 NASAL CONGESTION: Primary | ICD-10-CM

## 2025-05-07 RX ORDER — CETIRIZINE HYDROCHLORIDE 10 MG/1
10 TABLET ORAL DAILY
Qty: 90 TABLET | Refills: 1 | Status: SHIPPED | OUTPATIENT
Start: 2025-05-07

## 2025-05-07 RX ORDER — MONTELUKAST SODIUM 10 MG/1
10 TABLET ORAL AT BEDTIME
Qty: 90 TABLET | Refills: 1 | Status: SHIPPED | OUTPATIENT
Start: 2025-05-07

## 2025-05-07 RX ORDER — FLUTICASONE PROPIONATE 50 MCG
2 SPRAY, SUSPENSION (ML) NASAL DAILY
Qty: 47.4 ML | Refills: 3 | Status: SHIPPED | OUTPATIENT
Start: 2025-05-07

## 2025-05-07 NOTE — TELEPHONE ENCOUNTER
Signed Prescriptions:                        Disp   Refills    cetirizine (ZYRTEC) 10 MG tablet           90 tab*1        Sig: Take 1 tablet (10 mg) by mouth daily.  Authorizing Provider: JANET ZAVALA  Ordering User: JULIA GARCIA    montelukast (SINGULAIR) 10 MG tablet       90 tab*1        Sig: Take 1 tablet (10 mg) by mouth at bedtime.  Authorizing Provider: JANET ZAVALA  Ordering User: JULIA GARCIA    fluticasone (FLONASE) 50 MCG/ACT nasal spr*47.4 mL3        Sig: Spray 2 sprays into both nostrils daily.  Authorizing Provider: JANET ZAVALA  Ordering User: JULIA GARCIA    Received written orders for the above prescriptions from Dr. Jihan MD via pended orders in this encounter. Writer completed orders and sent to pharmacy. Pt notified via [employer] message.       Julia Garcia RN on 5/7/2025 at 1:11 PM

## 2025-07-13 ENCOUNTER — HEALTH MAINTENANCE LETTER (OUTPATIENT)
Age: 25
End: 2025-07-13

## (undated) DEVICE — BLADE SAW OSCILLATING STRK MICRO 9X10X0.51MM 2296-033-220

## (undated) DEVICE — ESU PENCIL SMOKE EVAC W/ROCKER SWITCH 0703-047-000

## (undated) DEVICE — SU MONOCRYL 3-0 PS-1 27" Y936H

## (undated) DEVICE — REAMER ARTHREX LOW PROFILE 10MM  AR-1410LP

## (undated) DEVICE — PACK ARTHROSCOPY CUSTOM ASC

## (undated) DEVICE — SU FIBERWIRE 2 38"  AR-7200

## (undated) DEVICE — PREP DURAPREP 26ML APL 8630

## (undated) DEVICE — TUBING SUCTION MEDI-VAC 1/4"X20' N620A

## (undated) DEVICE — LINEN TOWEL PACK X5 5464

## (undated) DEVICE — TUBING SYSTEM ARTHREX PATIENT REDEUCE AR-6421

## (undated) DEVICE — GLOVE PROTEXIS POWDER FREE SMT 8.0  2D72PT80X

## (undated) DEVICE — LINEN GOWN XLG 5407

## (undated) DEVICE — BUR ARTHREX COOLCUT SABRE 3.8MMX13CM AR-8380SR

## (undated) DEVICE — LINEN DRAPE 54X72" 5467

## (undated) DEVICE — PACK ACL SUPPLEMENT CUSTOM ASC

## (undated) DEVICE — ESU GROUND PAD ADULT W/CORD E7507

## (undated) DEVICE — PAD ARMBOARD FOAM EGGCRATE COVIDEN 3114367

## (undated) DEVICE — SOL NACL 0.9% IRRIG 3000ML BAG 2B7477

## (undated) DEVICE — LINEN ORTHO PACK 5446

## (undated) DEVICE — GLOVE PROTEXIS BLUE W/NEU-THERA 8.0  2D73EB80

## (undated) DEVICE — Device

## (undated) DEVICE — ABLATOR ARTHREX APOLLO RF MP90 ASPIRATING 90DEG AR-9811

## (undated) DEVICE — SU VICRYL 2-0 CT-1 27" UND J259H

## (undated) DEVICE — DRSG STERI STRIP 1/2X4" R1547

## (undated) DEVICE — PREP DURAPREP REMOVER 4OZ 8611

## (undated) DEVICE — SUCTION MANIFOLD NEPTUNE 2 SYS 4 PORT 0702-020-000

## (undated) DEVICE — PEN MARKING SKIN W/PAPER RULER 31145785

## (undated) DEVICE — SU ETHIBOND 1 CT-1 30" X425H

## (undated) DEVICE — SU LOOP #2 TIGERLOOP AR-7234T

## (undated) RX ORDER — FENTANYL CITRATE 50 UG/ML
INJECTION, SOLUTION INTRAMUSCULAR; INTRAVENOUS
Status: DISPENSED
Start: 2018-12-21

## (undated) RX ORDER — DEXAMETHASONE SODIUM PHOSPHATE 4 MG/ML
INJECTION, SOLUTION INTRA-ARTICULAR; INTRALESIONAL; INTRAMUSCULAR; INTRAVENOUS; SOFT TISSUE
Status: DISPENSED
Start: 2019-03-22

## (undated) RX ORDER — EPINEPHRINE 1 MG/ML
INJECTION, SOLUTION, CONCENTRATE INTRAVENOUS
Status: DISPENSED
Start: 2019-03-22

## (undated) RX ORDER — ACETAMINOPHEN 325 MG/1
TABLET ORAL
Status: DISPENSED
Start: 2018-12-21

## (undated) RX ORDER — LIDOCAINE HYDROCHLORIDE 5 MG/ML
INJECTION, SOLUTION INFILTRATION; INTRAVENOUS
Status: DISPENSED
Start: 2019-10-14

## (undated) RX ORDER — FENTANYL CITRATE 50 UG/ML
INJECTION, SOLUTION INTRAMUSCULAR; INTRAVENOUS
Status: DISPENSED
Start: 2019-03-22

## (undated) RX ORDER — PROPOFOL 10 MG/ML
INJECTION, EMULSION INTRAVENOUS
Status: DISPENSED
Start: 2018-12-21

## (undated) RX ORDER — ONDANSETRON 2 MG/ML
INJECTION INTRAMUSCULAR; INTRAVENOUS
Status: DISPENSED
Start: 2019-03-22

## (undated) RX ORDER — KETOROLAC TROMETHAMINE 30 MG/ML
INJECTION, SOLUTION INTRAMUSCULAR; INTRAVENOUS
Status: DISPENSED
Start: 2018-12-21

## (undated) RX ORDER — GABAPENTIN 300 MG/1
CAPSULE ORAL
Status: DISPENSED
Start: 2018-12-21

## (undated) RX ORDER — GABAPENTIN 300 MG/1
CAPSULE ORAL
Status: DISPENSED
Start: 2019-03-22

## (undated) RX ORDER — TRIAMCINOLONE ACETONIDE 40 MG/ML
INJECTION, SUSPENSION INTRA-ARTICULAR; INTRAMUSCULAR
Status: DISPENSED
Start: 2019-10-14

## (undated) RX ORDER — GLYCOPYRROLATE 0.2 MG/ML
INJECTION INTRAMUSCULAR; INTRAVENOUS
Status: DISPENSED
Start: 2019-03-22

## (undated) RX ORDER — DEXAMETHASONE SODIUM PHOSPHATE 4 MG/ML
INJECTION, SOLUTION INTRA-ARTICULAR; INTRALESIONAL; INTRAMUSCULAR; INTRAVENOUS; SOFT TISSUE
Status: DISPENSED
Start: 2018-12-21

## (undated) RX ORDER — PROPOFOL 10 MG/ML
INJECTION, EMULSION INTRAVENOUS
Status: DISPENSED
Start: 2019-03-22

## (undated) RX ORDER — ONDANSETRON 2 MG/ML
INJECTION INTRAMUSCULAR; INTRAVENOUS
Status: DISPENSED
Start: 2018-12-21

## (undated) RX ORDER — OXYCODONE HYDROCHLORIDE 5 MG/1
TABLET ORAL
Status: DISPENSED
Start: 2019-03-22

## (undated) RX ORDER — ACETAMINOPHEN 325 MG/1
TABLET ORAL
Status: DISPENSED
Start: 2019-03-22

## (undated) RX ORDER — KETOROLAC TROMETHAMINE 30 MG/ML
INJECTION, SOLUTION INTRAMUSCULAR; INTRAVENOUS
Status: DISPENSED
Start: 2019-03-22

## (undated) RX ORDER — BUPIVACAINE HYDROCHLORIDE 2.5 MG/ML
INJECTION, SOLUTION EPIDURAL; INFILTRATION; INTRACAUDAL
Status: DISPENSED
Start: 2019-03-22

## (undated) RX ORDER — LIDOCAINE HYDROCHLORIDE 20 MG/ML
INJECTION, SOLUTION EPIDURAL; INFILTRATION; INTRACAUDAL; PERINEURAL
Status: DISPENSED
Start: 2018-12-21

## (undated) RX ORDER — CEFAZOLIN SODIUM 2 G/50ML
SOLUTION INTRAVENOUS
Status: DISPENSED
Start: 2018-12-21

## (undated) RX ORDER — CEFAZOLIN SODIUM 1 G/3ML
INJECTION, POWDER, FOR SOLUTION INTRAMUSCULAR; INTRAVENOUS
Status: DISPENSED
Start: 2019-03-22

## (undated) RX ORDER — KETAMINE HCL IN 0.9 % NACL 50 MG/5 ML
SYRINGE (ML) INTRAVENOUS
Status: DISPENSED
Start: 2019-03-22

## (undated) RX ORDER — OXYCODONE HYDROCHLORIDE 5 MG/1
TABLET ORAL
Status: DISPENSED
Start: 2018-12-21

## (undated) RX ORDER — LIDOCAINE HYDROCHLORIDE 20 MG/ML
INJECTION, SOLUTION EPIDURAL; INFILTRATION; INTRACAUDAL; PERINEURAL
Status: DISPENSED
Start: 2019-03-22